# Patient Record
Sex: MALE | Race: WHITE | HISPANIC OR LATINO | Employment: FULL TIME | URBAN - METROPOLITAN AREA
[De-identification: names, ages, dates, MRNs, and addresses within clinical notes are randomized per-mention and may not be internally consistent; named-entity substitution may affect disease eponyms.]

---

## 2021-09-29 ENCOUNTER — HOSPITAL ENCOUNTER (EMERGENCY)
Facility: MEDICAL CENTER | Age: 36
End: 2021-09-29
Attending: EMERGENCY MEDICINE | Admitting: EMERGENCY MEDICINE
Payer: COMMERCIAL

## 2021-09-29 ENCOUNTER — APPOINTMENT (OUTPATIENT)
Dept: RADIOLOGY | Facility: MEDICAL CENTER | Age: 36
End: 2021-09-29
Attending: EMERGENCY MEDICINE
Payer: COMMERCIAL

## 2021-09-29 VITALS
TEMPERATURE: 98.7 F | SYSTOLIC BLOOD PRESSURE: 119 MMHG | OXYGEN SATURATION: 95 % | HEIGHT: 67 IN | RESPIRATION RATE: 19 BRPM | DIASTOLIC BLOOD PRESSURE: 75 MMHG | HEART RATE: 91 BPM | BODY MASS INDEX: 36.1 KG/M2 | WEIGHT: 230 LBS

## 2021-09-29 DIAGNOSIS — U07.1 PNEUMONIA DUE TO COVID-19 VIRUS: ICD-10-CM

## 2021-09-29 DIAGNOSIS — J12.82 PNEUMONIA DUE TO COVID-19 VIRUS: ICD-10-CM

## 2021-09-29 LAB
ALBUMIN SERPL BCP-MCNC: 3.7 G/DL (ref 3.2–4.9)
ALBUMIN/GLOB SERPL: 1.3 G/DL
ALP SERPL-CCNC: 83 U/L (ref 30–99)
ALT SERPL-CCNC: 308 U/L (ref 2–50)
ANION GAP SERPL CALC-SCNC: 15 MMOL/L (ref 7–16)
AST SERPL-CCNC: 123 U/L (ref 12–45)
BASOPHILS # BLD AUTO: 0.2 % (ref 0–1.8)
BASOPHILS # BLD: 0.01 K/UL (ref 0–0.12)
BILIRUB SERPL-MCNC: 0.6 MG/DL (ref 0.1–1.5)
BUN SERPL-MCNC: 17 MG/DL (ref 8–22)
CALCIUM SERPL-MCNC: 8.3 MG/DL (ref 8.4–10.2)
CHLORIDE SERPL-SCNC: 100 MMOL/L (ref 96–112)
CO2 SERPL-SCNC: 17 MMOL/L (ref 20–33)
CREAT SERPL-MCNC: 0.96 MG/DL (ref 0.5–1.4)
D DIMER PPP IA.FEU-MCNC: 0.59 UG/ML (FEU) (ref 0–0.5)
EKG IMPRESSION: NORMAL
EOSINOPHIL # BLD AUTO: 0 K/UL (ref 0–0.51)
EOSINOPHIL NFR BLD: 0 % (ref 0–6.9)
ERYTHROCYTE [DISTWIDTH] IN BLOOD BY AUTOMATED COUNT: 40.2 FL (ref 35.9–50)
GLOBULIN SER CALC-MCNC: 2.9 G/DL (ref 1.9–3.5)
GLUCOSE SERPL-MCNC: 101 MG/DL (ref 65–99)
HCT VFR BLD AUTO: 49.4 % (ref 42–52)
HGB BLD-MCNC: 17.4 G/DL (ref 14–18)
IMM GRANULOCYTES # BLD AUTO: 0.03 K/UL (ref 0–0.11)
IMM GRANULOCYTES NFR BLD AUTO: 0.6 % (ref 0–0.9)
LACTATE BLD-SCNC: 1.9 MMOL/L (ref 0.5–2)
LYMPHOCYTES # BLD AUTO: 0.76 K/UL (ref 1–4.8)
LYMPHOCYTES NFR BLD: 15.5 % (ref 22–41)
MCH RBC QN AUTO: 31.6 PG (ref 27–33)
MCHC RBC AUTO-ENTMCNC: 35.2 G/DL (ref 33.7–35.3)
MCV RBC AUTO: 89.8 FL (ref 81.4–97.8)
MONOCYTES # BLD AUTO: 0.33 K/UL (ref 0–0.85)
MONOCYTES NFR BLD AUTO: 6.7 % (ref 0–13.4)
NEUTROPHILS # BLD AUTO: 3.77 K/UL (ref 1.82–7.42)
NEUTROPHILS NFR BLD: 77 % (ref 44–72)
NRBC # BLD AUTO: 0 K/UL
NRBC BLD-RTO: 0 /100 WBC
PLATELET # BLD AUTO: 116 K/UL (ref 164–446)
PMV BLD AUTO: 11 FL (ref 9–12.9)
POTASSIUM SERPL-SCNC: 3.8 MMOL/L (ref 3.6–5.5)
PROT SERPL-MCNC: 6.6 G/DL (ref 6–8.2)
RBC # BLD AUTO: 5.5 M/UL (ref 4.7–6.1)
SODIUM SERPL-SCNC: 132 MMOL/L (ref 135–145)
TROPONIN T SERPL-MCNC: <6 NG/L (ref 6–19)
WBC # BLD AUTO: 4.9 K/UL (ref 4.8–10.8)

## 2021-09-29 PROCEDURE — A9270 NON-COVERED ITEM OR SERVICE: HCPCS | Performed by: EMERGENCY MEDICINE

## 2021-09-29 PROCEDURE — 85025 COMPLETE CBC W/AUTO DIFF WBC: CPT

## 2021-09-29 PROCEDURE — 71275 CT ANGIOGRAPHY CHEST: CPT

## 2021-09-29 PROCEDURE — 85379 FIBRIN DEGRADATION QUANT: CPT

## 2021-09-29 PROCEDURE — 700102 HCHG RX REV CODE 250 W/ 637 OVERRIDE(OP): Performed by: EMERGENCY MEDICINE

## 2021-09-29 PROCEDURE — 71045 X-RAY EXAM CHEST 1 VIEW: CPT

## 2021-09-29 PROCEDURE — 700117 HCHG RX CONTRAST REV CODE 255: Performed by: EMERGENCY MEDICINE

## 2021-09-29 PROCEDURE — 700105 HCHG RX REV CODE 258: Performed by: EMERGENCY MEDICINE

## 2021-09-29 PROCEDURE — 80053 COMPREHEN METABOLIC PANEL: CPT

## 2021-09-29 PROCEDURE — 700111 HCHG RX REV CODE 636 W/ 250 OVERRIDE (IP): Performed by: EMERGENCY MEDICINE

## 2021-09-29 PROCEDURE — 84484 ASSAY OF TROPONIN QUANT: CPT

## 2021-09-29 PROCEDURE — 87040 BLOOD CULTURE FOR BACTERIA: CPT

## 2021-09-29 PROCEDURE — 83605 ASSAY OF LACTIC ACID: CPT

## 2021-09-29 PROCEDURE — 99284 EMERGENCY DEPT VISIT MOD MDM: CPT

## 2021-09-29 PROCEDURE — 96374 THER/PROPH/DIAG INJ IV PUSH: CPT

## 2021-09-29 PROCEDURE — 93005 ELECTROCARDIOGRAM TRACING: CPT | Performed by: EMERGENCY MEDICINE

## 2021-09-29 RX ORDER — DEXAMETHASONE 4 MG/1
6 TABLET ORAL DAILY
Qty: 15 TABLET | Refills: 0 | Status: ON HOLD | OUTPATIENT
Start: 2021-09-29 | End: 2021-10-04 | Stop reason: SDUPTHER

## 2021-09-29 RX ORDER — ACETAMINOPHEN 500 MG
1000 TABLET ORAL EVERY 6 HOURS PRN
Status: ON HOLD | COMMUNITY
End: 2021-10-04 | Stop reason: SDUPTHER

## 2021-09-29 RX ORDER — BENZONATATE 100 MG/1
100 CAPSULE ORAL ONCE
Status: COMPLETED | OUTPATIENT
Start: 2021-09-29 | End: 2021-09-29

## 2021-09-29 RX ORDER — SODIUM CHLORIDE 9 MG/ML
1000 INJECTION, SOLUTION INTRAVENOUS ONCE
Status: COMPLETED | OUTPATIENT
Start: 2021-09-29 | End: 2021-09-29

## 2021-09-29 RX ORDER — IBUPROFEN 200 MG
600 TABLET ORAL EVERY 6 HOURS PRN
Status: ON HOLD | COMMUNITY
End: 2021-10-04

## 2021-09-29 RX ORDER — BENZONATATE 100 MG/1
100 CAPSULE ORAL 3 TIMES DAILY PRN
Status: DISCONTINUED | OUTPATIENT
Start: 2021-09-29 | End: 2021-09-29

## 2021-09-29 RX ORDER — ONDANSETRON 2 MG/ML
4 INJECTION INTRAMUSCULAR; INTRAVENOUS ONCE
Status: COMPLETED | OUTPATIENT
Start: 2021-09-29 | End: 2021-09-29

## 2021-09-29 RX ORDER — DEXAMETHASONE 4 MG/1
6 TABLET ORAL ONCE
Status: COMPLETED | OUTPATIENT
Start: 2021-09-29 | End: 2021-09-29

## 2021-09-29 RX ORDER — BENZONATATE 100 MG/1
100 CAPSULE ORAL 3 TIMES DAILY PRN
Qty: 60 CAPSULE | Refills: 0 | Status: SHIPPED | OUTPATIENT
Start: 2021-09-29 | End: 2021-10-12

## 2021-09-29 RX ORDER — ONDANSETRON 4 MG/1
4 TABLET, ORALLY DISINTEGRATING ORAL EVERY 8 HOURS PRN
Qty: 10 TABLET | Refills: 1 | Status: ON HOLD | OUTPATIENT
Start: 2021-09-29 | End: 2021-10-04

## 2021-09-29 RX ORDER — BENZONATATE 100 MG/1
100 CAPSULE ORAL
Status: COMPLETED | OUTPATIENT
Start: 2021-09-29 | End: 2021-09-29

## 2021-09-29 RX ADMIN — ONDANSETRON 4 MG: 2 INJECTION INTRAMUSCULAR; INTRAVENOUS at 15:34

## 2021-09-29 RX ADMIN — DEXAMETHASONE 6 MG: 4 TABLET ORAL at 15:22

## 2021-09-29 RX ADMIN — IOHEXOL 75 ML: 350 INJECTION, SOLUTION INTRAVENOUS at 19:36

## 2021-09-29 RX ADMIN — SODIUM CHLORIDE 500 ML: 9 INJECTION, SOLUTION INTRAVENOUS at 15:22

## 2021-09-29 RX ADMIN — BENZONATATE 100 MG: 100 CAPSULE ORAL at 20:49

## 2021-09-29 RX ADMIN — BENZONATATE 100 MG: 100 CAPSULE ORAL at 15:34

## 2021-09-29 NOTE — ED PROVIDER NOTES
ED Provider Note    CHIEF COMPLAINT  Chief Complaint   Patient presents with   • Flu Like Symptoms     symptoms started 9/21; c/o chills, HA, body aches, n/v   • Coronavirus Screening     tested POSITIVE 9/28   • Shortness of Breath     increased in severity last night.        HPI  Jacqueline Atkinson is a 36 y.o. male with no significant past medical history who presents complaining of Covid and increasing shortness of breath.  Patient is not vaccinated for Covid.    Patient states he began having flulike symptoms on 9/21 with chills, headache, body aches, nausea.  He tested positive for Covid on 9/28.    Last night he developed shortness of breath.  He feels that he is choking on his saliva.  He admits to snoring but does not believe that this is contributing to the situation.    Patient had a fever of 103.4 this morning.  He took ibuprofen at 8 AM.    Patient denies chest pain, sore throat, hemoptysis, personal or family history of PE/DVT, calf pain, leg swelling.    Patient has family members with similar symptoms, including his 6-month-old daughter.      ALLERGIES  No Known Allergies    CURRENT MEDICATIONS  Home Medications     Reviewed by Riki Wright (Pharmacy Tech) on 09/29/21 at 1614  Med List Status: Complete   Medication Last Dose Status   acetaminophen (TYLENOL) 500 MG Tab 9/29/2021 Active   ibuprofen (MOTRIN) 200 MG Tab 9/29/2021 Active                PAST MEDICAL HISTORY     Denies    SURGICAL HISTORY  patient denies any surgical history    SOCIAL HISTORY  Social History     Tobacco Use   • Smoking status: Current Some Day Smoker   • Smokeless tobacco: Never Used   Substance and Sexual Activity   • Alcohol use: Yes   • Drug use: Never   • Sexual activity: Not on file       Family Hx:  No family history of PE/DVT      REVIEW OF SYSTEMS  See HPI for further details.  All other systems are negative except as above in HPI.    PHYSICAL EXAM  VITAL SIGNS: /75   Pulse 91   Temp 37.1 °C (98.7 °F)  "(Temporal)   Resp 19   Ht 1.702 m (5' 7\")   Wt 104 kg (230 lb)   SpO2 95%   BMI 36.02 kg/m²    General:  WD male with elevated BMI, nontoxic appearing in NAD; A+Ox3; V/S as above; tachycardic, not tachypneic  Skin: warm and dry; good color; no rash  HEENT: NCAT; EOMs intact; PERRL; no scleral icterus   Neck: FROM, no stridor  Cardiovascular: Tachycardic heart rate and regular rhythm.  No murmurs, rubs, or gallops; pulses 2+ bilaterally radially and DP areas  Lungs: Clear to auscultation with good air movement bilaterally.  No wheezes, rhonchi, or rales.   Abdomen: BS present; soft; NTND; no rebound, guarding, or rigidity.  No organomegaly or pulsatile mass  Extremities: ALLRED x 4; no e/o trauma; no pedal edema; neg Dayana's  Neurologic: CNs III-XII grossly intact; speech clear; distal sensation intact; strength 5/5 UE/LEs  Psychiatric: Appropriate affect, normal mood    LABS  Results for orders placed or performed during the hospital encounter of 09/29/21   CBC WITH DIFFERENTIAL   Result Value Ref Range    WBC 4.9 4.8 - 10.8 K/uL    RBC 5.50 4.70 - 6.10 M/uL    Hemoglobin 17.4 14.0 - 18.0 g/dL    Hematocrit 49.4 42.0 - 52.0 %    MCV 89.8 81.4 - 97.8 fL    MCH 31.6 27.0 - 33.0 pg    MCHC 35.2 33.7 - 35.3 g/dL    RDW 40.2 35.9 - 50.0 fL    Platelet Count 116 (L) 164 - 446 K/uL    MPV 11.0 9.0 - 12.9 fL    Neutrophils-Polys 77.00 (H) 44.00 - 72.00 %    Lymphocytes 15.50 (L) 22.00 - 41.00 %    Monocytes 6.70 0.00 - 13.40 %    Eosinophils 0.00 0.00 - 6.90 %    Basophils 0.20 0.00 - 1.80 %    Immature Granulocytes 0.60 0.00 - 0.90 %    Nucleated RBC 0.00 /100 WBC    Neutrophils (Absolute) 3.77 1.82 - 7.42 K/uL    Lymphs (Absolute) 0.76 (L) 1.00 - 4.80 K/uL    Monos (Absolute) 0.33 0.00 - 0.85 K/uL    Eos (Absolute) 0.00 0.00 - 0.51 K/uL    Baso (Absolute) 0.01 0.00 - 0.12 K/uL    Immature Granulocytes (abs) 0.03 0.00 - 0.11 K/uL    NRBC (Absolute) 0.00 K/uL   CMP   Result Value Ref Range    Sodium 132 (L) 135 - 145 " mmol/L    Potassium 3.8 3.6 - 5.5 mmol/L    Chloride 100 96 - 112 mmol/L    Co2 17 (L) 20 - 33 mmol/L    Anion Gap 15.0 7.0 - 16.0    Glucose 101 (H) 65 - 99 mg/dL    Bun 17 8 - 22 mg/dL    Creatinine 0.96 0.50 - 1.40 mg/dL    Calcium 8.3 (L) 8.4 - 10.2 mg/dL    AST(SGOT) 123 (H) 12 - 45 U/L    ALT(SGPT) 308 (H) 2 - 50 U/L    Alkaline Phosphatase 83 30 - 99 U/L    Total Bilirubin 0.6 0.1 - 1.5 mg/dL    Albumin 3.7 3.2 - 4.9 g/dL    Total Protein 6.6 6.0 - 8.2 g/dL    Globulin 2.9 1.9 - 3.5 g/dL    A-G Ratio 1.3 g/dL   TROPONIN   Result Value Ref Range    Troponin T <6 6 - 19 ng/L   D-DIMER   Result Value Ref Range    D-Dimer Screen 0.59 (H) 0.00 - 0.50 ug/mL (FEU)   LACTIC ACID   Result Value Ref Range    Lactic Acid 1.9 0.5 - 2.0 mmol/L   ESTIMATED GFR   Result Value Ref Range    GFR If African American >60 >60 mL/min/1.73 m 2    GFR If Non African American >60 >60 mL/min/1.73 m 2   EKG   Result Value Ref Range    Report       [facility] Emergency Dept.    Test Date:  2021  Pt Name:    JEANE DELGADO               Department: [facility]  MRN:        [id number]                      [facility]  Gender:     Male                         Technician: NAVI  :        1985                   Requested By:SHIVA JACOBSEN  Order #:    [id number]                    Reading MD: SHIVA JACOBSEN MD    Measurements  Intervals                                Axis  Rate:       109                          P:          48  MO:         154                          QRS:        -77  QRSD:       94                           T:          16  QT:         331  QTc:        446    Interpretive Statements  Sinus tachycardia  Left anterior fascicular block  Abnormal R-wave progression, late transition  t wave inversions in III and aVF  ST elev, probable normal early repol pattern  No previous ECG available for comparison  Electronically Signed On 2021 15:48:41 PDT by SHIVA JACOBSEN MD          IMAGING  CT-CTA CHEST PULMONARY ARTERY W/ RECONS   Final Result         1. No definite CT evidence of pulmonary embolism. Evaluation is limited due to poor contrast enhancement. Ill-defined filling defect in the lingular subsegmental branch, likely artifact.      2. Patchy nodular groundglass and airspace opacities throughout both lungs, in keeping with Covid 19 pneumonia.      More airspace consolidation in the right lower lobe could relate to superimposed bacterial infection.         DX-CHEST-PORTABLE (1 VIEW)   Final Result      Patchy consolidation is compatible with Covid pneumonia        MEDICAL RECORD  I have reviewed patient's medical record and pertinent results are listed below.      COURSE & MEDICAL DECISION MAKING  I have reviewed any medical record information, laboratory studies and radiographic results as noted.    Jacqueline Atkinson is a 36 y.o. male who is known to be Covid positive presents complaining of shortness of breath.    Appropriate PPE was worn at all times while interacting with the patient.    Chest x-ray demonstrates Covid pneumonia.    Ambulatory pulse ox was 91% with a heart rate in the 120s.    Tachycardia may be due to dehydration as the patient had been vomiting and had decreased p.o. intake.  Patient is not currently febrile to explain the tachycardia.    NS bolus of 500 mL (to avoid volume overloading this Covid patient) was ordered for possible dehydration related to patient's decreased p.o. intake and vomiting.    Pt's HR improved to the low 100s after fluid bolus.    CT chest reveals no PE.  Reading mentions possible overlying bacterial process on the right.  The patient has a normal white blood cell count, negative lactic acid, and is afebrile.  He is not complaining of a productive cough with sputum.  He clearly has Covid pneumonia.  I feel that he should be treated as a viral process.     Pt was re-evaluated and advised of CT results.  His heart rate is now in the 80s.   He reports feeling improved after fluids, Zofran, and Tessalon.  Patient was advised to return to the ER for oxygen levels below 90% or worsening symptoms of shortness of breath, vomiting, or other concerns.      FINAL IMPRESSION  1. Pneumonia due to COVID-19 virus  dexamethasone (DECADRON) 4 MG Tab    benzonatate (TESSALON) 100 MG Cap    ondansetron (ZOFRAN ODT) 4 MG TABLET DISPERSIBLE       Electronically signed by: Nikki Ortiz M.D., 9/29/2021 2:03 PM

## 2021-09-29 NOTE — ED NOTES
Med rec updated and complete  Allergies reviewed  Pt reports no prescription medications or vitamins   Pt reports no antibiotics in the last 30 days.      No current facility-administered medications on file prior to encounter.     Current Outpatient Medications on File Prior to Encounter   Medication Sig Dispense Refill   • acetaminophen (TYLENOL) 500 MG Tab Take 1,000 mg by mouth every 6 hours as needed for Moderate Pain.     • ibuprofen (MOTRIN) 200 MG Tab Take 600 mg by mouth every 6 hours as needed for Mild Pain.

## 2021-09-29 NOTE — ED TRIAGE NOTES
"Chief Complaint   Patient presents with   • Flu Like Symptoms     symptoms started 9/21; c/o chills, HA, body aches, n/v   • Coronavirus Screening     tested POSITIVE 9/28   • Shortness of Breath     increased in severity last night.      /82   Pulse (!) 113   Temp 36.8 °C (98.3 °F) (Temporal)   Resp 20   Ht 1.702 m (5' 7\")   SpO2 93%     Pt arrived w/ above concern    Has this patient been vaccinated for COVID - NO  If not, would they like to be vaccinated while in the ER if eligible?  no  Would the patient like to speak with the ERP about the possibility of receiving the COVID vaccine today before making a decision? no  "

## 2021-09-30 NOTE — ED NOTES
Patient verbalized understanding to plan of care and discharge information. Patient in stable condition. No signs of distress. Patient pain free. Patient ambulatory out of ED to personal vehicle with stable gait by self to wife's vehicle.

## 2021-09-30 NOTE — DISCHARGE INSTRUCTIONS
Return to the ER for oxygen level less than 90% or increasing shortness of breath.    Take Tessalon Perles as needed for cough    Take Decadron daily for the next 10 days    Take Zofran as needed for nausea/vomiting    Drink fluids to stay hydrated

## 2021-10-02 ENCOUNTER — APPOINTMENT (OUTPATIENT)
Dept: RADIOLOGY | Facility: MEDICAL CENTER | Age: 36
DRG: 177 | End: 2021-10-02
Attending: EMERGENCY MEDICINE
Payer: COMMERCIAL

## 2021-10-02 ENCOUNTER — HOSPITAL ENCOUNTER (EMERGENCY)
Facility: MEDICAL CENTER | Age: 36
End: 2021-10-02
Payer: COMMERCIAL

## 2021-10-02 ENCOUNTER — HOSPITAL ENCOUNTER (INPATIENT)
Facility: MEDICAL CENTER | Age: 36
LOS: 2 days | DRG: 177 | End: 2021-10-04
Attending: EMERGENCY MEDICINE | Admitting: FAMILY MEDICINE
Payer: COMMERCIAL

## 2021-10-02 DIAGNOSIS — J12.82 PNEUMONIA DUE TO COVID-19 VIRUS: ICD-10-CM

## 2021-10-02 DIAGNOSIS — R06.82 TACHYPNEA: ICD-10-CM

## 2021-10-02 DIAGNOSIS — R09.02 HYPOXEMIA: ICD-10-CM

## 2021-10-02 DIAGNOSIS — U07.1 ACUTE RESPIRATORY FAILURE DUE TO COVID-19 (HCC): ICD-10-CM

## 2021-10-02 DIAGNOSIS — J96.00 ACUTE RESPIRATORY FAILURE DUE TO COVID-19 (HCC): ICD-10-CM

## 2021-10-02 DIAGNOSIS — U07.1 PNEUMONIA DUE TO COVID-19 VIRUS: ICD-10-CM

## 2021-10-02 PROBLEM — R74.01 ELEVATED ALT MEASUREMENT: Status: ACTIVE | Noted: 2021-10-02

## 2021-10-02 PROBLEM — E87.1 HYPONATREMIA: Status: ACTIVE | Noted: 2021-10-02

## 2021-10-02 LAB
ALBUMIN SERPL BCP-MCNC: 4.2 G/DL (ref 3.2–4.9)
ALBUMIN/GLOB SERPL: 1.4 G/DL
ALP SERPL-CCNC: 79 U/L (ref 30–99)
ALT SERPL-CCNC: 147 U/L (ref 2–50)
ANION GAP SERPL CALC-SCNC: 12 MMOL/L (ref 7–16)
AST SERPL-CCNC: 41 U/L (ref 12–45)
BASOPHILS # BLD AUTO: 0.1 % (ref 0–1.8)
BASOPHILS # BLD: 0.01 K/UL (ref 0–0.12)
BILIRUB SERPL-MCNC: 0.5 MG/DL (ref 0.1–1.5)
BUN SERPL-MCNC: 16 MG/DL (ref 8–22)
CALCIUM SERPL-MCNC: 8.7 MG/DL (ref 8.4–10.2)
CHLORIDE SERPL-SCNC: 100 MMOL/L (ref 96–112)
CO2 SERPL-SCNC: 21 MMOL/L (ref 20–33)
CREAT SERPL-MCNC: 0.67 MG/DL (ref 0.5–1.4)
CRP SERPL HS-MCNC: 4.69 MG/DL (ref 0–0.75)
D DIMER PPP IA.FEU-MCNC: 0.44 UG/ML (FEU) (ref 0–0.5)
EKG IMPRESSION: NORMAL
EOSINOPHIL # BLD AUTO: 0 K/UL (ref 0–0.51)
EOSINOPHIL NFR BLD: 0 % (ref 0–6.9)
ERYTHROCYTE [DISTWIDTH] IN BLOOD BY AUTOMATED COUNT: 41 FL (ref 35.9–50)
GLOBULIN SER CALC-MCNC: 2.9 G/DL (ref 1.9–3.5)
GLUCOSE SERPL-MCNC: 116 MG/DL (ref 65–99)
HCT VFR BLD AUTO: 49.7 % (ref 42–52)
HGB BLD-MCNC: 17.1 G/DL (ref 14–18)
IMM GRANULOCYTES # BLD AUTO: 0.05 K/UL (ref 0–0.11)
IMM GRANULOCYTES NFR BLD AUTO: 0.6 % (ref 0–0.9)
LYMPHOCYTES # BLD AUTO: 0.48 K/UL (ref 1–4.8)
LYMPHOCYTES NFR BLD: 5.3 % (ref 22–41)
MCH RBC QN AUTO: 31.5 PG (ref 27–33)
MCHC RBC AUTO-ENTMCNC: 34.4 G/DL (ref 33.7–35.3)
MCV RBC AUTO: 91.5 FL (ref 81.4–97.8)
MONOCYTES # BLD AUTO: 0.37 K/UL (ref 0–0.85)
MONOCYTES NFR BLD AUTO: 4.1 % (ref 0–13.4)
NEUTROPHILS # BLD AUTO: 8.15 K/UL (ref 1.82–7.42)
NEUTROPHILS NFR BLD: 89.9 % (ref 44–72)
NRBC # BLD AUTO: 0 K/UL
NRBC BLD-RTO: 0 /100 WBC
PLATELET # BLD AUTO: 172 K/UL (ref 164–446)
PMV BLD AUTO: 10.4 FL (ref 9–12.9)
POTASSIUM SERPL-SCNC: 4.5 MMOL/L (ref 3.6–5.5)
PROCALCITONIN SERPL-MCNC: 0.13 NG/ML
PROT SERPL-MCNC: 7.1 G/DL (ref 6–8.2)
RBC # BLD AUTO: 5.43 M/UL (ref 4.7–6.1)
SODIUM SERPL-SCNC: 133 MMOL/L (ref 135–145)
TROPONIN T SERPL-MCNC: <6 NG/L (ref 6–19)
WBC # BLD AUTO: 9.1 K/UL (ref 4.8–10.8)

## 2021-10-02 PROCEDURE — 71045 X-RAY EXAM CHEST 1 VIEW: CPT

## 2021-10-02 PROCEDURE — 770006 HCHG ROOM/CARE - MED/SURG/GYN SEMI*

## 2021-10-02 PROCEDURE — 99223 1ST HOSP IP/OBS HIGH 75: CPT | Mod: AI | Performed by: FAMILY MEDICINE

## 2021-10-02 PROCEDURE — 8E0ZXY6 ISOLATION: ICD-10-PCS | Performed by: FAMILY MEDICINE

## 2021-10-02 PROCEDURE — 700102 HCHG RX REV CODE 250 W/ 637 OVERRIDE(OP): Performed by: FAMILY MEDICINE

## 2021-10-02 PROCEDURE — A9270 NON-COVERED ITEM OR SERVICE: HCPCS | Performed by: FAMILY MEDICINE

## 2021-10-02 PROCEDURE — 85025 COMPLETE CBC W/AUTO DIFF WBC: CPT

## 2021-10-02 PROCEDURE — 86140 C-REACTIVE PROTEIN: CPT

## 2021-10-02 PROCEDURE — 80053 COMPREHEN METABOLIC PANEL: CPT

## 2021-10-02 PROCEDURE — 85379 FIBRIN DEGRADATION QUANT: CPT

## 2021-10-02 PROCEDURE — 84484 ASSAY OF TROPONIN QUANT: CPT

## 2021-10-02 PROCEDURE — 93005 ELECTROCARDIOGRAM TRACING: CPT | Performed by: EMERGENCY MEDICINE

## 2021-10-02 PROCEDURE — 99285 EMERGENCY DEPT VISIT HI MDM: CPT

## 2021-10-02 PROCEDURE — 84145 PROCALCITONIN (PCT): CPT

## 2021-10-02 RX ORDER — BISACODYL 10 MG
10 SUPPOSITORY, RECTAL RECTAL
Status: DISCONTINUED | OUTPATIENT
Start: 2021-10-02 | End: 2021-10-04 | Stop reason: HOSPADM

## 2021-10-02 RX ORDER — DEXAMETHASONE 4 MG/1
6 TABLET ORAL DAILY
Status: DISCONTINUED | OUTPATIENT
Start: 2021-10-03 | End: 2021-10-04 | Stop reason: HOSPADM

## 2021-10-02 RX ORDER — LABETALOL HYDROCHLORIDE 5 MG/ML
10 INJECTION, SOLUTION INTRAVENOUS EVERY 4 HOURS PRN
Status: DISCONTINUED | OUTPATIENT
Start: 2021-10-02 | End: 2021-10-04 | Stop reason: HOSPADM

## 2021-10-02 RX ORDER — PROMETHAZINE HYDROCHLORIDE 25 MG/1
12.5-25 SUPPOSITORY RECTAL EVERY 4 HOURS PRN
Status: DISCONTINUED | OUTPATIENT
Start: 2021-10-02 | End: 2021-10-04 | Stop reason: HOSPADM

## 2021-10-02 RX ORDER — AMOXICILLIN 250 MG
2 CAPSULE ORAL 2 TIMES DAILY
Status: DISCONTINUED | OUTPATIENT
Start: 2021-10-02 | End: 2021-10-04 | Stop reason: HOSPADM

## 2021-10-02 RX ORDER — POLYETHYLENE GLYCOL 3350 17 G/17G
1 POWDER, FOR SOLUTION ORAL
Status: DISCONTINUED | OUTPATIENT
Start: 2021-10-02 | End: 2021-10-04 | Stop reason: HOSPADM

## 2021-10-02 RX ORDER — ENALAPRILAT 1.25 MG/ML
1.25 INJECTION INTRAVENOUS EVERY 6 HOURS PRN
Status: DISCONTINUED | OUTPATIENT
Start: 2021-10-02 | End: 2021-10-04 | Stop reason: HOSPADM

## 2021-10-02 RX ORDER — ACETAMINOPHEN 325 MG/1
650 TABLET ORAL EVERY 6 HOURS PRN
Status: DISCONTINUED | OUTPATIENT
Start: 2021-10-02 | End: 2021-10-04 | Stop reason: HOSPADM

## 2021-10-02 RX ORDER — ONDANSETRON 4 MG/1
4 TABLET, ORALLY DISINTEGRATING ORAL EVERY 4 HOURS PRN
Status: DISCONTINUED | OUTPATIENT
Start: 2021-10-02 | End: 2021-10-04 | Stop reason: HOSPADM

## 2021-10-02 RX ORDER — GUAIFENESIN/DEXTROMETHORPHAN 100-10MG/5
10 SYRUP ORAL EVERY 6 HOURS PRN
Status: DISCONTINUED | OUTPATIENT
Start: 2021-10-02 | End: 2021-10-04 | Stop reason: HOSPADM

## 2021-10-02 RX ORDER — PROCHLORPERAZINE EDISYLATE 5 MG/ML
5-10 INJECTION INTRAMUSCULAR; INTRAVENOUS EVERY 4 HOURS PRN
Status: DISCONTINUED | OUTPATIENT
Start: 2021-10-02 | End: 2021-10-04 | Stop reason: HOSPADM

## 2021-10-02 RX ORDER — ALBUTEROL SULFATE 90 UG/1
2 AEROSOL, METERED RESPIRATORY (INHALATION)
Status: DISCONTINUED | OUTPATIENT
Start: 2021-10-02 | End: 2021-10-04 | Stop reason: HOSPADM

## 2021-10-02 RX ORDER — ONDANSETRON 2 MG/ML
4 INJECTION INTRAMUSCULAR; INTRAVENOUS EVERY 4 HOURS PRN
Status: DISCONTINUED | OUTPATIENT
Start: 2021-10-02 | End: 2021-10-04 | Stop reason: HOSPADM

## 2021-10-02 RX ORDER — CLONIDINE HYDROCHLORIDE 0.1 MG/1
0.1 TABLET ORAL EVERY 6 HOURS PRN
Status: DISCONTINUED | OUTPATIENT
Start: 2021-10-02 | End: 2021-10-04 | Stop reason: HOSPADM

## 2021-10-02 RX ORDER — PROMETHAZINE HYDROCHLORIDE 25 MG/1
12.5-25 TABLET ORAL EVERY 4 HOURS PRN
Status: DISCONTINUED | OUTPATIENT
Start: 2021-10-02 | End: 2021-10-04 | Stop reason: HOSPADM

## 2021-10-02 RX ADMIN — GUAIFENESIN SYRUP AND DEXTROMETHORPHAN 10 ML: 100; 10 SYRUP ORAL at 23:11

## 2021-10-02 ASSESSMENT — PATIENT HEALTH QUESTIONNAIRE - PHQ9
SUM OF ALL RESPONSES TO PHQ9 QUESTIONS 1 AND 2: 2
6. FEELING BAD ABOUT YOURSELF - OR THAT YOU ARE A FAILURE OR HAVE LET YOURSELF OR YOUR FAMILY DOWN: SEVERAL DAYS
SUM OF ALL RESPONSES TO PHQ QUESTIONS 1-9: 8
9. THOUGHTS THAT YOU WOULD BE BETTER OFF DEAD, OR OF HURTING YOURSELF: NOT AT ALL
1. LITTLE INTEREST OR PLEASURE IN DOING THINGS: SEVERAL DAYS
7. TROUBLE CONCENTRATING ON THINGS, SUCH AS READING THE NEWSPAPER OR WATCHING TELEVISION: SEVERAL DAYS
3. TROUBLE FALLING OR STAYING ASLEEP OR SLEEPING TOO MUCH: SEVERAL DAYS
8. MOVING OR SPEAKING SO SLOWLY THAT OTHER PEOPLE COULD HAVE NOTICED. OR THE OPPOSITE, BEING SO FIGETY OR RESTLESS THAT YOU HAVE BEEN MOVING AROUND A LOT MORE THAN USUAL: SEVERAL DAYS
4. FEELING TIRED OR HAVING LITTLE ENERGY: SEVERAL DAYS
5. POOR APPETITE OR OVEREATING: SEVERAL DAYS
2. FEELING DOWN, DEPRESSED, IRRITABLE, OR HOPELESS: SEVERAL DAYS

## 2021-10-02 ASSESSMENT — LIFESTYLE VARIABLES
CONSUMPTION TOTAL: INCOMPLETE
TOTAL SCORE: 2
EVER HAD A DRINK FIRST THING IN THE MORNING TO STEADY YOUR NERVES TO GET RID OF A HANGOVER: YES
ALCOHOL_USE: YES
TOTAL SCORE: 2
HAVE PEOPLE ANNOYED YOU BY CRITICIZING YOUR DRINKING: NO
ON A TYPICAL DAY WHEN YOU DRINK ALCOHOL HOW MANY DRINKS DO YOU HAVE: 8
TOTAL SCORE: 2
EVER FELT BAD OR GUILTY ABOUT YOUR DRINKING: NO
HAVE YOU EVER FELT YOU SHOULD CUT DOWN ON YOUR DRINKING: YES
AVERAGE NUMBER OF DAYS PER WEEK YOU HAVE A DRINK CONTAINING ALCOHOL: 5

## 2021-10-02 ASSESSMENT — COGNITIVE AND FUNCTIONAL STATUS - GENERAL
STANDING UP FROM CHAIR USING ARMS: A LITTLE
TOILETING: A LITTLE
CLIMB 3 TO 5 STEPS WITH RAILING: A LITTLE
SUGGESTED CMS G CODE MODIFIER MOBILITY: CJ
DAILY ACTIVITIY SCORE: 23
MOBILITY SCORE: 20
WALKING IN HOSPITAL ROOM: A LITTLE
SUGGESTED CMS G CODE MODIFIER DAILY ACTIVITY: CI
MOVING FROM LYING ON BACK TO SITTING ON SIDE OF FLAT BED: A LITTLE

## 2021-10-02 ASSESSMENT — ENCOUNTER SYMPTOMS
FEVER: 1
SPUTUM PRODUCTION: 0
VOMITING: 0
COUGH: 1
SHORTNESS OF BREATH: 1
ABDOMINAL PAIN: 0
CHILLS: 1
MYALGIAS: 1
NAUSEA: 0

## 2021-10-02 ASSESSMENT — PAIN DESCRIPTION - PAIN TYPE: TYPE: ACUTE PAIN

## 2021-10-02 ASSESSMENT — FIBROSIS 4 INDEX: FIB4 SCORE: 2.24

## 2021-10-02 NOTE — ASSESSMENT & PLAN NOTE
- Pt requiring 4L on the ER - has been on Decadron since 9/29, will continue while in hospital  - Pt not requiring >6L O2, has been symptomatic for >10 days - does not meet criteria for Remdesevir or Toci/Alessandro  - Prone as able  - Isolation  - IS  - PRN albuterol    10/3: Patient seen at bedside this morning.  Still requiring 4 L of oxygen.  We will continue Decadron for now.  Self proning and incentive spirometer as tolerated.

## 2021-10-02 NOTE — ED PROVIDER NOTES
ED Provider Note    CHIEF COMPLAINT  Chief Complaint   Patient presents with   • Coronavirus Screening        HPI  Jacqueline Atkinson is a 36 y.o. male who presents to the ED with worsening symptoms of shortness of breath.  Patient initially started having symptoms on 21 September he was tested positive on the 28th was seen on the 29th in the emergency department had a CT scan of his chest which showed no pulmonary emboli but Covid pneumonia.  Patient was started on dexamethasone 10 days worth of 6 mg tablets.  The patient has been on for about the past 4 days but he just feels like he is getting worse started having increasing shortness of breath he did have a pulse oximeter at home and he was in the low 80s and upper 70s at home so he decided to come back to the emergency department.  Upon arrival here he just describes increasing shortness of breath describes fevers chills headaches muscle aches nonproductive cough.  Patient denies any other symptoms.    REVIEW OF SYSTEMS  See HPI for further details. All other systems are negative.     PAST MEDICAL HISTORY  No past medical history on file.    FAMILY HISTORY  No family history on file.    SOCIAL HISTORY  Social History     Socioeconomic History   • Marital status:      Spouse name: Not on file   • Number of children: Not on file   • Years of education: Not on file   • Highest education level: Not on file   Occupational History   • Not on file   Tobacco Use   • Smoking status: Current Some Day Smoker   • Smokeless tobacco: Never Used   Substance and Sexual Activity   • Alcohol use: Yes   • Drug use: Never   • Sexual activity: Not on file   Other Topics Concern   • Not on file   Social History Narrative   • Not on file     Social Determinants of Health     Financial Resource Strain:    • Difficulty of Paying Living Expenses:    Food Insecurity:    • Worried About Running Out of Food in the Last Year:    • Ran Out of Food in the Last Year:    Transportation  Needs:    • Lack of Transportation (Medical):    • Lack of Transportation (Non-Medical):    Physical Activity:    • Days of Exercise per Week:    • Minutes of Exercise per Session:    Stress:    • Feeling of Stress :    Social Connections:    • Frequency of Communication with Friends and Family:    • Frequency of Social Gatherings with Friends and Family:    • Attends [employer] Services:    • Active Member of Clubs or Organizations:    • Attends Club or Organization Meetings:    • Marital Status:    Intimate Partner Violence:    • Fear of Current or Ex-Partner:    • Emotionally Abused:    • Physically Abused:    • Sexually Abused:       No primary care provider on file.      SURGICAL HISTORY  No past surgical history on file.    CURRENT MEDICATIONS  Home Medications     Reviewed by Riki Lipscomb (Pharmacy Tech) on 10/02/21 at 1510  Med List Status: Complete   Medication Last Dose Status   acetaminophen (TYLENOL) 500 MG Tab PRN Active   Ascorbic Acid (VITAMIN C PO) 10/1/2021 Active   benzonatate (TESSALON) 100 MG Cap 10/2/2021 Active   dexamethasone (DECADRON) 4 MG Tab 10/2/2021 Active   ibuprofen (MOTRIN) 200 MG Tab 10/2/2021 Active   Multiple Vitamins-Minerals (ZINC PO) 10/1/2021 Active   ondansetron (ZOFRAN ODT) 4 MG TABLET DISPERSIBLE 10/1/2021 Active   VITAMIN D PO 10/1/2021 Active              No current facility-administered medications on file prior to encounter.     Current Outpatient Medications on File Prior to Encounter   Medication Sig Dispense Refill   • Ascorbic Acid (VITAMIN C PO) Take 1 Tablet by mouth 2 times a day.     • VITAMIN D PO Take 1 Tablet by mouth every morning.     • Multiple Vitamins-Minerals (ZINC PO) Take 1 Tablet by mouth every morning.     • acetaminophen (TYLENOL) 500 MG Tab Take 1,000 mg by mouth every 6 hours as needed for Moderate Pain.     • ibuprofen (MOTRIN) 200 MG Tab Take 600 mg by mouth every 6 hours as needed for Mild Pain.     • dexamethasone (DECADRON) 4 MG Tab  "Take 1.5 Tablets by mouth every day for 10 days. 15 Tablet 0   • benzonatate (TESSALON) 100 MG Cap Take 1 Capsule by mouth 3 times a day as needed for Cough. 60 Capsule 0   • ondansetron (ZOFRAN ODT) 4 MG TABLET DISPERSIBLE Take 1 Tablet by mouth every 8 hours as needed for Nausea. 10 Tablet 1         ALLERGIES  Allergies   Allergen Reactions   • Peach Flavor Rash and Itching     Itching & Rash         PHYSICAL EXAM  VITAL SIGNS: /62   Pulse 68   Temp 36.5 °C (97.7 °F) (Tympanic)   Resp 20   Ht 1.702 m (5' 7\")   Wt 101 kg (222 lb 14.2 oz)   SpO2 93%   BMI 34.91 kg/m²    Pulse Oximetry was obtained. It showed a reading of Pulse Oximetry: 93 %.  I interpreted this as on 4 L which is nonhypoxic the patient was 84% on room air which is interpreted as hypoxic.     Constitutional: Well developed, Well nourished, No acute distress, Non-toxic appearance.  Mildly tachypneic  HENT: Normocephalic, Atraumatic, Bilateral external ears normal, bilateral tympanic membranes normal, Oropharynx dry mucous membranes, No oral exudates, Nose normal.   Eyes:  conjunctiva is normal, there are no signs of exudate.   Neck: Supple, no cervical lymphadenopathy, no meningeal signs..   Lymphatic: No lymphadenopathy noted.   Cardiovascular: Regular rate and rhythm without murmurs gallops or rubs.   Thorax & Lungs: Lungs are clear to auscultation bilaterally but diminished there is some mild crackles, there are no wheezes. Chest wall is nontender.  Abdomen: Soft, nontender nondistended. Bowel sounds are present.   Skin: Warm, Dry, No erythema,   Back: No tenderness, No CVA tenderness.  Musculoskeletal: Good range of motion in all major joints. No tenderness to palpation or major deformities noted. Intact distal pulses, no clubbing, no cyanosis, no edema no asymmetric edema no tenderness  Neurologic: Alert & oriented x 3, Normal motor function, Normal sensory function, No focal deficits noted.   Psychiatric: Affect normal, Judgment " normal, Mood normal.     EKG  Interpreted below by myself    RADIOLOGY/PROCEDURES  DX-CHEST-PORTABLE (1 VIEW)   Final Result      Again seen patchy bilateral infiltrates consistent with: pneumonia.          Results for orders placed or performed during the hospital encounter of 10/02/21   CBC with Differential   Result Value Ref Range    WBC 9.1 4.8 - 10.8 K/uL    RBC 5.43 4.70 - 6.10 M/uL    Hemoglobin 17.1 14.0 - 18.0 g/dL    Hematocrit 49.7 42.0 - 52.0 %    MCV 91.5 81.4 - 97.8 fL    MCH 31.5 27.0 - 33.0 pg    MCHC 34.4 33.7 - 35.3 g/dL    RDW 41.0 35.9 - 50.0 fL    Platelet Count 172 164 - 446 K/uL    MPV 10.4 9.0 - 12.9 fL    Neutrophils-Polys 89.90 (H) 44.00 - 72.00 %    Lymphocytes 5.30 (L) 22.00 - 41.00 %    Monocytes 4.10 0.00 - 13.40 %    Eosinophils 0.00 0.00 - 6.90 %    Basophils 0.10 0.00 - 1.80 %    Immature Granulocytes 0.60 0.00 - 0.90 %    Nucleated RBC 0.00 /100 WBC    Neutrophils (Absolute) 8.15 (H) 1.82 - 7.42 K/uL    Lymphs (Absolute) 0.48 (L) 1.00 - 4.80 K/uL    Monos (Absolute) 0.37 0.00 - 0.85 K/uL    Eos (Absolute) 0.00 0.00 - 0.51 K/uL    Baso (Absolute) 0.01 0.00 - 0.12 K/uL    Immature Granulocytes (abs) 0.05 0.00 - 0.11 K/uL    NRBC (Absolute) 0.00 K/uL   Complete Metabolic Panel (CMP)   Result Value Ref Range    Sodium 133 (L) 135 - 145 mmol/L    Potassium 4.5 3.6 - 5.5 mmol/L    Chloride 100 96 - 112 mmol/L    Co2 21 20 - 33 mmol/L    Anion Gap 12.0 7.0 - 16.0    Glucose 116 (H) 65 - 99 mg/dL    Bun 16 8 - 22 mg/dL    Creatinine 0.67 0.50 - 1.40 mg/dL    Calcium 8.7 8.4 - 10.2 mg/dL    AST(SGOT) 41 12 - 45 U/L    ALT(SGPT) 147 (H) 2 - 50 U/L    Alkaline Phosphatase 79 30 - 99 U/L    Total Bilirubin 0.5 0.1 - 1.5 mg/dL    Albumin 4.2 3.2 - 4.9 g/dL    Total Protein 7.1 6.0 - 8.2 g/dL    Globulin 2.9 1.9 - 3.5 g/dL    A-G Ratio 1.4 g/dL   ESTIMATED GFR   Result Value Ref Range    GFR If African American >60 >60 mL/min/1.73 m 2    GFR If Non African American >60 >60 mL/min/1.73 m 2    PROCALCITONIN   Result Value Ref Range    Procalcitonin 0.13 <0.25 ng/mL   D-DIMER   Result Value Ref Range    D-Dimer Screen 0.44 0.00 - 0.50 ug/mL (FEU)   CRP QUANTITIVE (NON-CARDIAC)   Result Value Ref Range    Stat C-Reactive Protein 4.69 (H) 0.00 - 0.75 mg/dL   EKG   Result Value Ref Range    Report       [facility] Emergency Dept.    Test Date:  2021-10-02  Pt Name:    JEANE DELGADO               Department: [facility]  MRN:        [id number]                      Room:       -ROOM 5  Gender:     Male                         Technician: SATHYA  :        1985                   Requested By:BIANCA GARCIA  Order #:    [id number]                    Reading MD: BIANCA GARCIA MD    Measurements  Intervals                                Axis  Rate:       62                           P:          36  [place]:         150                          QRS:        -23  QRSD:       117                          T:          12  QT:         425  QTc:        432    Interpretive Statements  Sinus rhythm  Nonspecific intraventricular conduction delay  ST elev, probable normal early repol pattern  Compared to ECG 2021 15:34:09  Intraventricular conduction delay now present  Sinus tachycardia no longer present  Left anterior fascicular block no longer present  ST (T wave) deviati on still present  Electronically Signed On 10-2-2021 14:26:20 PDT by BIANCA GARCIA MD           COURSE & MEDICAL DECISION MAKING  Pertinent Labs & Imaging studies reviewed. (See chart for details)  She presents emerge department for evaluation.  The patient does have Covid chest x-ray is unchanged EKG as above.  Patient is rather tachypneic upon initial evaluation when he gets up to walk he goes up into the 38 range.  At this point he does not meet criteria for home monitoring or home oxygen because of his tachypnea and hypoxemia so we will admit the patient for further treatment and care I have spoken to the hospitalist  for this admission.  Currently he is on what appears to be maximal therapy of steroids oxygen.    FINAL IMPRESSION  1. Pneumonia due to COVID-19 virus     2. Hypoxemia     3. Tachypnea                 Electronically signed by: Akash Oro M.D., 10/2/2021 11:42 AM

## 2021-10-02 NOTE — ED TRIAGE NOTES
Chief Complaint   Patient presents with   • Coronavirus Screening   Arrives positive covid with SOB, 84% on room air, O2 applied at 3L.  He was seen here this week for same, returns due to low sats and feeling terrible.

## 2021-10-02 NOTE — ASSESSMENT & PLAN NOTE
- Continue decadron  - Had negative CTA on 9/29 - will recheck d-dimer, procal and CRP given his worsening respiratory status since then  - Does not qualify for Remdesevir   - Initiate antibiotics only if procal is elevated  - Avoid IVFs as able    10/3: Continue decadron for now.

## 2021-10-02 NOTE — ED NOTES
Pharmacy Medication Reconciliation      ~Medication reconciliation updated and complete per pt at bedside  ~Allergies have been verified and updated   ~No oral ABX within the last 30 days  ~Patient home pharmacy:[facility]      ~Patient reports he started a 10 day course of Decadron 09/29/2021

## 2021-10-02 NOTE — ASSESSMENT & PLAN NOTE
- Pt relates a history of fatty liver, labs consistent with previous 2019 labs at [facility]  - Continue to monitor    10/3: This could also be due to to the patient's current COVID-19 status.  We will monitor, repeat CMP.

## 2021-10-02 NOTE — H&P
Hospital Medicine History & Physical Note    Date of Service  10/2/2021    Primary Care Physician  No primary care provider on file.    Consultants  None    Specialist Names: None    Code Status  Full Code    Chief Complaint  Chief Complaint   Patient presents with   • Coronavirus Screening       History of Presenting Illness  Jacqueline Atkinson is a 36 y.o. male who presented 10/2/2021 with COVID PNA. He denies a past medical history other than fatty liver.  His symptoms began September 21st, and he tested positive on 9/28 - he is unvaccinated.  He came to our ER on 9/29 for SOB and was discharged with Decadron, which he states he has been compliant with.  He also had a negative CTA at that time. He went home but states that he continues to spike fevers as high as 103.2, with increasing SOB - he is primarily SOB when he has a coughing spell (nonproductive), somewhat PARISH, and has chest pain but only when coughing. He has no abdominal pain or other complaints at this time. On arrival to the ER, he required 4L of O2, and became dyspneic with a RR of 38 when ambulating.    I discussed the plan of care with patient and ERp.    Review of Systems  Review of Systems   Constitutional: Positive for chills and fever.   Respiratory: Positive for cough and shortness of breath. Negative for sputum production.    Cardiovascular: Positive for chest pain (When coughing). Negative for leg swelling.   Gastrointestinal: Negative for abdominal pain, nausea and vomiting.   Musculoskeletal: Positive for myalgias.   All other systems reviewed and are negative.      Past Medical History   has no past medical history on file.    Surgical History   has no past surgical history on file.     Family History  family history is not on file.   Family history reviewed with patient. There is no family history that is pertinent to the chief complaint.     Social History   reports that he has been smoking. He has never used smokeless tobacco. He reports  current alcohol use. He reports that he does not use drugs.    Allergies  Allergies   Allergen Reactions   • Peach Flavor Rash and Itching     Itching & Rash         Medications  Prior to Admission Medications   Prescriptions Last Dose Informant Patient Reported? Taking?   Ascorbic Acid (VITAMIN C PO) 10/1/2021 at PM Patient Yes Yes   Sig: Take 1 Tablet by mouth 2 times a day.   Multiple Vitamins-Minerals (ZINC PO) 10/1/2021 at AM Patient Yes Yes   Sig: Take 1 Tablet by mouth every morning.   VITAMIN D PO 10/1/2021 at AM Patient Yes Yes   Sig: Take 1 Tablet by mouth every morning.   acetaminophen (TYLENOL) 500 MG Tab PRN at PRN Patient Yes No   Sig: Take 1,000 mg by mouth every 6 hours as needed for Moderate Pain.   benzonatate (TESSALON) 100 MG Cap 10/2/2021 at 0700 Patient No No   Sig: Take 1 Capsule by mouth 3 times a day as needed for Cough.   dexamethasone (DECADRON) 4 MG Tab 10/2/2021 at 0700 Patient No No   Sig: Take 1.5 Tablets by mouth every day for 10 days.   ibuprofen (MOTRIN) 200 MG Tab 10/2/2021 at 0800 Patient Yes No   Sig: Take 600 mg by mouth every 6 hours as needed for Mild Pain.   ondansetron (ZOFRAN ODT) 4 MG TABLET DISPERSIBLE 10/1/2021 at AFTERNOON Patient No No   Sig: Take 1 Tablet by mouth every 8 hours as needed for Nausea.      Facility-Administered Medications: None       Physical Exam  Temp:  [36.5 °C (97.7 °F)] 36.5 °C (97.7 °F)  Pulse:  [60-89] 85  Resp:  [16-38] 28  BP: (108-133)/(63-81) 126/69  SpO2:  [93 %-95 %] 94 %  Blood Pressure: 126/69   Temperature: 36.5 °C (97.7 °F)   Pulse: 85   Respiration: (!) 28   Pulse Oximetry: 94 %       Physical Exam  Vitals and nursing note reviewed.   Constitutional:       Appearance: He is ill-appearing. He is not toxic-appearing.   HENT:      Head: Normocephalic and atraumatic.   Cardiovascular:      Rate and Rhythm: Normal rate and regular rhythm.      Heart sounds: No murmur heard.     Pulmonary:      Effort: Pulmonary effort is normal.       Breath sounds: Normal breath sounds.      Comments: Diminished bilaterally    Abdominal:      General: Abdomen is flat. Bowel sounds are normal. There is no distension.      Palpations: Abdomen is soft.      Tenderness: There is no abdominal tenderness. There is no guarding or rebound.   Musculoskeletal:         General: No swelling.   Skin:     General: Skin is warm and dry.   Neurological:      General: No focal deficit present.      Mental Status: He is alert and oriented to person, place, and time.   Psychiatric:         Mood and Affect: Mood normal.         Behavior: Behavior normal.         Laboratory:  Recent Labs     10/02/21  1140   WBC 9.1   RBC 5.43   HEMOGLOBIN 17.1   HEMATOCRIT 49.7   MCV 91.5   MCH 31.5   MCHC 34.4   RDW 41.0   PLATELETCT 172   MPV 10.4     Recent Labs     10/02/21  1140   SODIUM 133*   POTASSIUM 4.5   CHLORIDE 100   CO2 21   GLUCOSE 116*   BUN 16   CREATININE 0.67   CALCIUM 8.7     Recent Labs     10/02/21  1140   ALTSGPT 147*   ASTSGOT 41   ALKPHOSPHAT 79   TBILIRUBIN 0.5   GLUCOSE 116*         No results for input(s): NTPROBNP in the last 72 hours.      No results for input(s): TROPONINT in the last 72 hours.    Imaging:  DX-CHEST-PORTABLE (1 VIEW)   Final Result      Again seen patchy bilateral infiltrates consistent with: pneumonia.          X-Ray:  I have personally reviewed the images and compared with prior images. and My impression is: COVID PNA - bilateral infiltrates  EKG:  I have personally reviewed the images and compared with prior images. and My impression is: Early repol    Assessment/Plan:  I anticipate this patient will require at least two midnights for appropriate medical management, necessitating inpatient admission.    * Acute respiratory failure due to COVID-19 (HCC)  Assessment & Plan  - Pt requiring 4L on the ER - has been on Decadron since 9/29, will continue while in hospital  - Pt not requiring >6L O2, has been symptomatic for >10 days - does not meet  criteria for Remdesevir or Toci/Alessandro  - Prone as able  - Isolation  - IS  - PRN albuterol      Hyponatremia  Assessment & Plan  - Mild, monitor    Elevated ALT measurement  Assessment & Plan  - Pt relates a history of fatty liver, labs consistent with previous 2019 labs at [facility]  - Covid contribution??  - Continue to monitor    Pneumonia due to COVID-19 virus  Assessment & Plan  - Continue decadron  - Had negative CTA on 9/29 - will recheck d-dimer, procal and CRP given his worsening respiratory status since then  - Does not qualify for Remdesevir   - Initiate antibiotics only if procal is elevated  - Avoid IVFs as able      VTE prophylaxis: enoxaparin ppx

## 2021-10-03 PROBLEM — Z71.89 ADVANCE CARE PLANNING: Status: ACTIVE | Noted: 2021-10-03

## 2021-10-03 LAB
ALBUMIN SERPL BCP-MCNC: 3.2 G/DL (ref 3.2–4.9)
ALBUMIN/GLOB SERPL: 1 G/DL
ALP SERPL-CCNC: 75 U/L (ref 30–99)
ALT SERPL-CCNC: 145 U/L (ref 2–50)
ANION GAP SERPL CALC-SCNC: 11 MMOL/L (ref 7–16)
AST SERPL-CCNC: 53 U/L (ref 12–45)
BASOPHILS # BLD AUTO: 0 % (ref 0–1.8)
BASOPHILS # BLD: 0 K/UL (ref 0–0.12)
BILIRUB SERPL-MCNC: 0.6 MG/DL (ref 0.1–1.5)
BUN SERPL-MCNC: 18 MG/DL (ref 8–22)
CALCIUM SERPL-MCNC: 8.6 MG/DL (ref 8.4–10.2)
CHLORIDE SERPL-SCNC: 105 MMOL/L (ref 96–112)
CO2 SERPL-SCNC: 19 MMOL/L (ref 20–33)
CREAT SERPL-MCNC: 0.57 MG/DL (ref 0.5–1.4)
EOSINOPHIL # BLD AUTO: 0 K/UL (ref 0–0.51)
EOSINOPHIL NFR BLD: 0 % (ref 0–6.9)
ERYTHROCYTE [DISTWIDTH] IN BLOOD BY AUTOMATED COUNT: 42.5 FL (ref 35.9–50)
GLOBULIN SER CALC-MCNC: 3.1 G/DL (ref 1.9–3.5)
GLUCOSE SERPL-MCNC: 104 MG/DL (ref 65–99)
HCT VFR BLD AUTO: 49.8 % (ref 42–52)
HGB BLD-MCNC: 16.9 G/DL (ref 14–18)
LYMPHOCYTES # BLD AUTO: 1.39 K/UL (ref 1–4.8)
LYMPHOCYTES NFR BLD: 22 % (ref 22–41)
MANUAL DIFF BLD: NORMAL
MCH RBC QN AUTO: 31.2 PG (ref 27–33)
MCHC RBC AUTO-ENTMCNC: 33.9 G/DL (ref 33.7–35.3)
MCV RBC AUTO: 92.1 FL (ref 81.4–97.8)
MONOCYTES # BLD AUTO: 0.44 K/UL (ref 0–0.85)
MONOCYTES NFR BLD AUTO: 7 % (ref 0–13.4)
NEUTROPHILS # BLD AUTO: 4.47 K/UL (ref 1.82–7.42)
NEUTROPHILS NFR BLD: 70 % (ref 44–72)
NEUTS BAND NFR BLD MANUAL: 1 % (ref 0–10)
NRBC # BLD AUTO: 0 K/UL
NRBC BLD-RTO: 0 /100 WBC
PLATELET # BLD AUTO: 174 K/UL (ref 164–446)
PLATELET BLD QL SMEAR: NORMAL
PMV BLD AUTO: 10.5 FL (ref 9–12.9)
POTASSIUM SERPL-SCNC: 4.3 MMOL/L (ref 3.6–5.5)
PROT SERPL-MCNC: 6.3 G/DL (ref 6–8.2)
RBC # BLD AUTO: 5.41 M/UL (ref 4.7–6.1)
RBC BLD AUTO: NORMAL
SODIUM SERPL-SCNC: 135 MMOL/L (ref 135–145)
WBC # BLD AUTO: 6.3 K/UL (ref 4.8–10.8)

## 2021-10-03 PROCEDURE — 80053 COMPREHEN METABOLIC PANEL: CPT

## 2021-10-03 PROCEDURE — 85027 COMPLETE CBC AUTOMATED: CPT

## 2021-10-03 PROCEDURE — 770006 HCHG ROOM/CARE - MED/SURG/GYN SEMI*

## 2021-10-03 PROCEDURE — A9270 NON-COVERED ITEM OR SERVICE: HCPCS | Performed by: FAMILY MEDICINE

## 2021-10-03 PROCEDURE — 700102 HCHG RX REV CODE 250 W/ 637 OVERRIDE(OP): Performed by: FAMILY MEDICINE

## 2021-10-03 PROCEDURE — 99233 SBSQ HOSP IP/OBS HIGH 50: CPT | Performed by: INTERNAL MEDICINE

## 2021-10-03 PROCEDURE — 85007 BL SMEAR W/DIFF WBC COUNT: CPT

## 2021-10-03 PROCEDURE — 700111 HCHG RX REV CODE 636 W/ 250 OVERRIDE (IP): Performed by: FAMILY MEDICINE

## 2021-10-03 RX ADMIN — GUAIFENESIN SYRUP AND DEXTROMETHORPHAN 10 ML: 100; 10 SYRUP ORAL at 08:00

## 2021-10-03 RX ADMIN — ENOXAPARIN SODIUM 40 MG: 40 INJECTION SUBCUTANEOUS at 06:14

## 2021-10-03 RX ADMIN — DEXAMETHASONE 6 MG: 4 TABLET ORAL at 06:14

## 2021-10-03 RX ADMIN — GUAIFENESIN SYRUP AND DEXTROMETHORPHAN 10 ML: 100; 10 SYRUP ORAL at 21:11

## 2021-10-03 ASSESSMENT — PATIENT HEALTH QUESTIONNAIRE - PHQ9
4. FEELING TIRED OR HAVING LITTLE ENERGY: SEVERAL DAYS
8. MOVING OR SPEAKING SO SLOWLY THAT OTHER PEOPLE COULD HAVE NOTICED. OR THE OPPOSITE, BEING SO FIGETY OR RESTLESS THAT YOU HAVE BEEN MOVING AROUND A LOT MORE THAN USUAL: NOT AT ALL
7. TROUBLE CONCENTRATING ON THINGS, SUCH AS READING THE NEWSPAPER OR WATCHING TELEVISION: NOT AT ALL
3. TROUBLE FALLING OR STAYING ASLEEP OR SLEEPING TOO MUCH: NOT AT ALL
SUM OF ALL RESPONSES TO PHQ QUESTIONS 1-9: 3
1. LITTLE INTEREST OR PLEASURE IN DOING THINGS: SEVERAL DAYS
9. THOUGHTS THAT YOU WOULD BE BETTER OFF DEAD, OR OF HURTING YOURSELF: NOT AT ALL
2. FEELING DOWN, DEPRESSED, IRRITABLE, OR HOPELESS: SEVERAL DAYS
6. FEELING BAD ABOUT YOURSELF - OR THAT YOU ARE A FAILURE OR HAVE LET YOURSELF OR YOUR FAMILY DOWN: NOT AL ALL
SUM OF ALL RESPONSES TO PHQ9 QUESTIONS 1 AND 2: 2
5. POOR APPETITE OR OVEREATING: NOT AT ALL

## 2021-10-03 ASSESSMENT — ENCOUNTER SYMPTOMS
BLURRED VISION: 0
CHILLS: 0
ABDOMINAL PAIN: 0
HEARTBURN: 0
DOUBLE VISION: 0
PALPITATIONS: 0
FEVER: 0
COUGH: 1
NERVOUS/ANXIOUS: 0
MYALGIAS: 1
VOMITING: 0
HEADACHES: 0
SHORTNESS OF BREATH: 1
BACK PAIN: 0
FALLS: 0
DIZZINESS: 0

## 2021-10-03 ASSESSMENT — PAIN DESCRIPTION - PAIN TYPE
TYPE: ACUTE PAIN
TYPE: ACUTE PAIN

## 2021-10-03 ASSESSMENT — LIFESTYLE VARIABLES: SUBSTANCE_ABUSE: 0

## 2021-10-03 NOTE — PROGRESS NOTES
Patient remained on 4 L throughout the night. He proned for about 2 hours.    covid-19 surge in effect

## 2021-10-03 NOTE — PROGRESS NOTES
"Hospital Medicine Daily Progress Note    Date of Service  10/3/2021    Chief Complaint  Jacqueline Atkinson is a 36 y.o. male admitted 10/2/2021 with shortness of breath    Hospital Course  As per chart review:  \"36 y.o. male who presented 10/2/2021 with COVID PNA. He denies a past medical history other than fatty liver.  His symptoms began September 21st, and he tested positive on 9/28 - he is unvaccinated.  He came to our ER on 9/29 for SOB and was discharged with Decadron, which he states he has been compliant with.  He also had a negative CTA at that time. He went home but states that he continues to spike fevers as high as 103.2, with increasing SOB - he is primarily SOB when he has a coughing spell (nonproductive), somewhat PARISH, and has chest pain but only when coughing. He has no abdominal pain or other complaints at this time. On arrival to the ER, he required 4L of O2, and became dyspneic with a RR of 38 when ambulating.\"    Interval Problem Update  10/3: Patient seen at bedside this morning.  Patient lying in bed.  He mentions he is feeling somewhat better today.  Patient still requiring 4 L of oxygen.  We will continue to titrate down as tolerated.  Continue self proning and incentive spirometer as tolerated.  We will continue with Decadron for now.  As per nursing no other overnight events reported.    I have personally seen and examined the patient at bedside. I discussed the plan of care with patient and charge RN.    Consultants/Specialty  None for now    Code Status  Full Code    Disposition  Patient is not medically cleared.   Anticipate discharge to pending clinical evolution.    Review of Systems  Review of Systems   Constitutional: Positive for malaise/fatigue. Negative for chills and fever.   HENT: Negative for hearing loss and nosebleeds.    Eyes: Negative for blurred vision and double vision.   Respiratory: Positive for cough and shortness of breath.    Cardiovascular: Negative for chest pain and " palpitations.   Gastrointestinal: Negative for abdominal pain, heartburn and vomiting.   Genitourinary: Negative for dysuria and urgency.   Musculoskeletal: Positive for myalgias. Negative for back pain and falls.   Skin: Negative for itching and rash.   Neurological: Negative for dizziness and headaches.   Psychiatric/Behavioral: Negative for substance abuse. The patient is not nervous/anxious.    All other systems reviewed and are negative.       Physical Exam  Temp:  [36.4 °C (97.5 °F)-36.8 °C (98.2 °F)] 36.6 °C (97.8 °F)  Pulse:  [59-85] 65  Resp:  [16-38] 18  BP: (108-126)/(62-76) 112/70  SpO2:  [92 %-95 %] 94 %    Physical Exam  Vitals and nursing note reviewed.   Constitutional:       Appearance: Normal appearance.   HENT:      Head: Normocephalic and atraumatic.      Right Ear: External ear normal.      Left Ear: External ear normal.      Nose: Nose normal.      Mouth/Throat:      Mouth: Mucous membranes are moist.      Pharynx: Oropharynx is clear.   Eyes:      General:         Right eye: No discharge.         Left eye: No discharge.      Extraocular Movements: Extraocular movements intact.      Pupils: Pupils are equal, round, and reactive to light.   Cardiovascular:      Rate and Rhythm: Normal rate and regular rhythm.      Heart sounds: No murmur heard.     Pulmonary:      Effort: Respiratory distress present.      Breath sounds: Rhonchi present.   Abdominal:      General: Bowel sounds are normal. There is no distension.      Palpations: Abdomen is soft.      Tenderness: There is no abdominal tenderness.   Musculoskeletal:      Cervical back: Normal range of motion and neck supple.      Right lower leg: No edema.      Left lower leg: No edema.   Skin:     General: Skin is warm.   Neurological:      General: No focal deficit present.      Mental Status: He is alert and oriented to person, place, and time.   Psychiatric:         Mood and Affect: Mood normal.         Behavior: Behavior normal.          Thought Content: Thought content normal.         Judgment: Judgment normal.         Fluids  No intake or output data in the 24 hours ending 10/03/21 1209    Laboratory  Recent Labs     10/02/21  1140 10/03/21  0314   WBC 9.1 6.3   RBC 5.43 5.41   HEMOGLOBIN 17.1 16.9   HEMATOCRIT 49.7 49.8   MCV 91.5 92.1   MCH 31.5 31.2   MCHC 34.4 33.9   RDW 41.0 42.5   PLATELETCT 172 174   MPV 10.4 10.5     Recent Labs     10/02/21  1140 10/03/21  0314   SODIUM 133* 135   POTASSIUM 4.5 4.3   CHLORIDE 100 105   CO2 21 19*   GLUCOSE 116* 104*   BUN 16 18   CREATININE 0.67 0.57   CALCIUM 8.7 8.6                   Imaging  DX-CHEST-PORTABLE (1 VIEW)   Final Result      Again seen patchy bilateral infiltrates consistent with: pneumonia.           Assessment/Plan  * Acute respiratory failure due to COVID-19 (HCC)  Assessment & Plan  - Pt requiring 4L on the ER - has been on Decadron since 9/29, will continue while in hospital  - Pt not requiring >6L O2, has been symptomatic for >10 days - does not meet criteria for Remdesevir or Toci/Alessandro  - Prone as able  - Isolation  - IS  - PRN albuterol    10/3: Patient seen at bedside this morning.  Still requiring 4 L of oxygen.  We will continue Decadron for now.  Self proning and incentive spirometer as tolerated.      Pneumonia due to COVID-19 virus  Assessment & Plan  - Continue decadron  - Had negative CTA on 9/29 - will recheck d-dimer, procal and CRP given his worsening respiratory status since then  - Does not qualify for Remdesevir   - Initiate antibiotics only if procal is elevated  - Avoid IVFs as able    10/3: Continue decadron for now.    Advance care planning  Assessment & Plan  We talked at length regarding different options for CODE STATUS.  Patient would like to remain full code for now.  If needed the patient would like to be intubated.    Hyponatremia  Assessment & Plan  - Mild, monitor    10/3: Within normal limits today    Elevated ALT measurement  Assessment & Plan  - Pt  relates a history of fatty liver, labs consistent with previous 2019 labs at [facility]  - Continue to monitor    10/3: This could also be due to to the patient's current COVID-19 status.  We will monitor, repeat CMP.       VTE prophylaxis: enoxaparin ppx    I have performed a physical exam and reviewed and updated ROS and Plan today (10/3/2021). In review of yesterday's note (10/2/2021), there are no changes except as documented above.

## 2021-10-03 NOTE — ASSESSMENT & PLAN NOTE
We talked at length regarding different options for CODE STATUS.  Patient would like to remain full code for now.  If needed the patient would like to be intubated.

## 2021-10-03 NOTE — PROGRESS NOTES
Assumed patient care. Patient is resting comfortably in bed in no signs of pain or distress. No questions or concerns at this time. Safety measures in place, call light within reach.     covid-19 surge in effect

## 2021-10-03 NOTE — FACE TO FACE
"Face to Face Note  -  Durable Medical Equipment    Eloy Huff M.D. - NPI: [id number]  I certify that this patient is under my care and that they had a durable medical equipment(DME)face to face encounter by myself that meets the physician DME face-to-face encounter requirements with this patient on:    Date of encounter:   Patient:                    MRN:                       YOB: 2022  Jacqueline Atkinson  [id number]  1985     The encounter with the patient was in whole, or in part, for the following medical condition, which is the primary reason for durable medical equipment:  Covid-19 Infection    I certify that, based on my findings, the following durable medical equipment is medically necessary:  Oxygen.    HOME O2 Saturation Measurements:(Values must be present for Home Oxygen orders)  Room air sat at rest: 88  Room air sat with amb: 86  With liters of O2: 4, O2 sat at rest with O2: 96  With Liters of O2: 4, O2 sat with amb with O2 : 92  Is the patient mobile?: Yes    My Clinical findings support the need for the above equipment due to:  Hypoxia    Supporting Symptoms: The patient requires supplemental oxygen, as the following interventions have been tried with limited or no improvement: \"Ambulation with oximetry and \"Incentive spirometry    If patient feels more short of breath, they can go up to 6 liters per minute and contact healthcare provider.  "

## 2021-10-04 ENCOUNTER — TELEPHONE (OUTPATIENT)
Dept: OTHER | Facility: MEDICAL CENTER | Age: 36
End: 2021-10-04

## 2021-10-04 VITALS
RESPIRATION RATE: 20 BRPM | TEMPERATURE: 99.2 F | HEART RATE: 77 BPM | BODY MASS INDEX: 34.98 KG/M2 | WEIGHT: 222.88 LBS | OXYGEN SATURATION: 95 % | HEIGHT: 67 IN | SYSTOLIC BLOOD PRESSURE: 98 MMHG | DIASTOLIC BLOOD PRESSURE: 66 MMHG

## 2021-10-04 VITALS — HEART RATE: 72 BPM | RESPIRATION RATE: 24 BRPM | OXYGEN SATURATION: 94 %

## 2021-10-04 LAB
BACTERIA BLD CULT: NORMAL
BACTERIA BLD CULT: NORMAL
SIGNIFICANT IND 70042: NORMAL
SIGNIFICANT IND 70042: NORMAL
SITE SITE: NORMAL
SITE SITE: NORMAL
SOURCE SOURCE: NORMAL
SOURCE SOURCE: NORMAL

## 2021-10-04 PROCEDURE — 99454 REM MNTR PHYSIOL PARAM 16-30: CPT

## 2021-10-04 PROCEDURE — 99453 REM MNTR PHYSIOL PARAM SETUP: CPT

## 2021-10-04 PROCEDURE — A9270 NON-COVERED ITEM OR SERVICE: HCPCS | Performed by: FAMILY MEDICINE

## 2021-10-04 PROCEDURE — 700102 HCHG RX REV CODE 250 W/ 637 OVERRIDE(OP): Performed by: FAMILY MEDICINE

## 2021-10-04 PROCEDURE — 99239 HOSP IP/OBS DSCHRG MGMT >30: CPT | Performed by: INTERNAL MEDICINE

## 2021-10-04 PROCEDURE — 700111 HCHG RX REV CODE 636 W/ 250 OVERRIDE (IP): Performed by: FAMILY MEDICINE

## 2021-10-04 RX ORDER — DEXAMETHASONE 4 MG/1
6 TABLET ORAL DAILY
Qty: 9 TABLET | Refills: 0 | Status: SHIPPED | OUTPATIENT
Start: 2021-10-04 | End: 2021-10-10

## 2021-10-04 RX ORDER — ACETAMINOPHEN 500 MG
1000 TABLET ORAL EVERY 8 HOURS PRN
Qty: 30 TABLET | Refills: 0 | Status: SHIPPED | OUTPATIENT
Start: 2021-10-04 | End: 2022-04-19

## 2021-10-04 RX ADMIN — ENOXAPARIN SODIUM 40 MG: 40 INJECTION SUBCUTANEOUS at 05:37

## 2021-10-04 RX ADMIN — DEXAMETHASONE 6 MG: 4 TABLET ORAL at 05:37

## 2021-10-04 NOTE — DISCHARGE PLANNING
Received Choice form at 1038  Agency/Facility Name: [facility], [facility], [facility], [facility], [facility], [facility]  Referral sent per Choice form @ 1040     1256-  Agency/Facility Name: [facility]  Spoke To: Katie   Outcome: Referral was not received, DPA re faxed referral with request for callback when received.     1307-  Agency/Facility Name: [facility]  Outcome: Per e-fax response, Pt declined due to non contracted insurance provider.     1310-  Agency/Facility Name: [facility]  Spoke To: Ralph  Outcome: Pt declined due to non contracted insurance provider.    1312-  Agency/Facility Name: [facility]  Referral sent per Choice form @ 1313     1438-  Agency/Facility Name: [facility]   Spoke To: Qiana  Outcome: Order has not been received, DPA resent referral with request for callback upon delivery.     1515-  Agency/Facility Name: [facility]  Spoke To: Pallavi  Outcome: DPA confirmed DME acceptance and delivery, ETA of 1600.

## 2021-10-04 NOTE — PROGRESS NOTES
Pending home oxygen acceptance and delivery. Patient interested in home monitoring system. RN to assist with [employer] download and activation of account.

## 2021-10-04 NOTE — DISCHARGE INSTRUCTIONS
- Discharge Instructions to Pt:  · Follow up with your Primary Care Physician   · Be compliant with your follow up appointments.  · Please be compliant with your medications  · Keep blood pressure controlled and be compliant to keep systolic blood pressure <140.  · Please adhere to a healthy diet, that consist of low fat, low salt, low calorie.  · Weight loss and physical activity as tolerated is encouraged.   · Ambulate with assistance.  ·  If there any signs or symptoms of worsening or symptoms recurring, please call your Primary Care Physician or return to Emergency Department for further assessment.  · Avoid sudden changes in position, like standing up quickly.  · Do not drive until cleared by PCP.  · Refrain from drinking alcohol and smoking.     Recommendation to PCP:  · Would recommend a CBC, BMP in 3-5 days.  · Your pt will need close monitoring.  · If  failure to respond to therapy, we suggest having patient return for further care, or if cannot be treated as an outpatient, return to ED if worsening of symptoms.  · Please make certain your pt follows up with specialist appointments  · Ensure patient adheres to diet and lifestyle modifications and reconcile.  · Please note the change to medication and reconcile.  · Patient without progression of symptoms. Prognosis and risk factors discussed in detail with patient and he understands.    Discharge Instructions    Discharged to home by car with relative. Discharged via wheelchair, hospital escort: Yes.  Special equipment needed: Oxygen    Be sure to schedule a follow-up appointment with your primary care doctor or any specialists as instructed.     Discharge Plan:   Diet Plan: Discussed  Activity Level: Discussed  Confirmed Follow up Appointment: Patient to Call and Schedule Appointment  Confirmed Symptoms Management: Discussed  Medication Reconciliation Updated: Yes    I understand that a diet low in cholesterol, fat, and sodium is recommended for good  health. Unless I have been given specific instructions below for another diet, I accept this instruction as my diet prescription.   Other diet: home diet    Special Instructions: None    · Is patient discharged on Warfarin / Coumadin?   No     Depression / Suicide Risk    As you are discharged from this [facility] facility, it is important to learn how to keep safe from harming yourself.    Recognize the warning signs:  · Abrupt changes in personality, positive or negative- including increase in energy   · Giving away possessions  · Change in eating patterns- significant weight changes-  positive or negative  · Change in sleeping patterns- unable to sleep or sleeping all the time   · Unwillingness or inability to communicate  · Depression  · Unusual sadness, discouragement and loneliness  · Talk of wanting to die  · Neglect of personal appearance   · Rebelliousness- reckless behavior  · Withdrawal from people/activities they love  · Confusion- inability to concentrate     If you or a loved one observes any of these behaviors or has concerns about self-harm, here's what you can do:  · Talk about it- your feelings and reasons for harming yourself  · Remove any means that you might use to hurt yourself (examples: pills, rope, extension cords, firearm)  · Get professional help from the community (Mental Health, Substance Abuse, psychological counseling)  · Do not be alone:Call your Safe Contact- someone whom you trust who will be there for you.  · Call your local CRISIS HOTLINE 729-9292 or 722-288-9827  · Call your local Children's Mobile Crisis Response Team [place] (233) 531-3649 or www.[employer]  · Call the toll free National Suicide Prevention Hotlines   · National Suicide Prevention Lifeline 497-076-VMUZ (2342)  · National Hope Line Network 800-SUICIDE (963-7468)      COVID-19  COVID-19 is a respiratory infection that is caused by a virus called severe acute respiratory syndrome coronavirus 2  (SARS-CoV-2). The disease is also known as coronavirus disease or novel coronavirus. In some people, the virus may not cause any symptoms. In others, it may cause a serious infection. The infection can get worse quickly and can lead to complications, such as:  · Pneumonia, or infection of the lungs.  · Acute respiratory distress syndrome or ARDS. This is fluid build-up in the lungs.  · Acute respiratory failure. This is a condition in which there is not enough oxygen passing from the lungs to the body.  · Sepsis or septic shock. This is a serious bodily reaction to an infection.  · Blood clotting problems.  · Secondary infections due to bacteria or fungus.  The virus that causes COVID-19 is contagious. This means that it can spread from person to person through droplets from coughs and sneezes (respiratory secretions).  What are the causes?  This illness is caused by a virus. You may catch the virus by:  · Breathing in droplets from an infected person's cough or sneeze.  · Touching something, like a table or a doorknob, that was exposed to the virus (contaminated) and then touching your mouth, nose, or eyes.  What increases the risk?  Risk for infection  You are more likely to be infected with this virus if you:  · Live in or travel to an area with a COVID-19 outbreak.  · Come in contact with a sick person who recently traveled to an area with a COVID-19 outbreak.  · Provide care for or live with a person who is infected with COVID-19.  Risk for serious illness  You are more likely to become seriously ill from the virus if you:  · Are 65 years of age or older.  · Have a long-term disease that lowers your body's ability to fight infection (immunocompromised).  · Live in a nursing home or long-term care facility.  · Have a long-term (chronic) disease such as:  ? Chronic lung disease, including chronic obstructive pulmonary disease or asthma  ? Heart disease.  ? Diabetes.  ? Chronic kidney disease.  ? Liver  disease.  · Are obese.  What are the signs or symptoms?  Symptoms of this condition can range from mild to severe. Symptoms may appear any time from 2 to 14 days after being exposed to the virus. They include:  · A fever.  · A cough.  · Difficulty breathing.  · Chills.  · Muscle pains.  · A sore throat.  · Loss of taste or smell.  Some people may also have stomach problems, such as nausea, vomiting, or diarrhea.  Other people may not have any symptoms of COVID-19.  How is this diagnosed?  This condition may be diagnosed based on:  · Your signs and symptoms, especially if:  ? You live in an area with a COVID-19 outbreak.  ? You recently traveled to or from an area where the virus is common.  ? You provide care for or live with a person who was diagnosed with COVID-19.  · A physical exam.  · Lab tests, which may include:  ? A nasal swab to take a sample of fluid from your nose.  ? A throat swab to take a sample of fluid from your throat.  ? A sample of mucus from your lungs (sputum).  ? Blood tests.  · Imaging tests, which may include, X-rays, CT scan, or ultrasound.  How is this treated?  At present, there is no medicine to treat COVID-19. Medicines that treat other diseases are being used on a trial basis to see if they are effective against COVID-19.  Your health care provider will talk with you about ways to treat your symptoms. For most people, the infection is mild and can be managed at home with rest, fluids, and over-the-counter medicines.  Treatment for a serious infection usually takes places in a hospital intensive care unit (ICU). It may include one or more of the following treatments. These treatments are given until your symptoms improve.  · Receiving fluids and medicines through an IV.  · Supplemental oxygen. Extra oxygen is given through a tube in the nose, a face mask, or a hart.  · Positioning you to lie on your stomach (prone position). This makes it easier for oxygen to get into the  lungs.  · Continuous positive airway pressure (CPAP) or bi-level positive airway pressure (BPAP) machine. This treatment uses mild air pressure to keep the airways open. A tube that is connected to a motor delivers oxygen to the body.  · Ventilator. This treatment moves air into and out of the lungs by using a tube that is placed in your windpipe.  · Tracheostomy. This is a procedure to create a hole in the neck so that a breathing tube can be inserted.  · Extracorporeal membrane oxygenation (ECMO). This procedure gives the lungs a chance to recover by taking over the functions of the heart and lungs. It supplies oxygen to the body and removes carbon dioxide.  Follow these instructions at home:  Lifestyle  · If you are sick, stay home except to get medical care. Your health care provider will tell you how long to stay home. Call your health care provider before you go for medical care.  · Rest at home as told by your health care provider.  · Do not use any products that contain nicotine or tobacco, such as cigarettes, e-cigarettes, and chewing tobacco. If you need help quitting, ask your health care provider.  · Return to your normal activities as told by your health care provider. Ask your health care provider what activities are safe for you.  General instructions  · Take over-the-counter and prescription medicines only as told by your health care provider.  · Drink enough fluid to keep your urine pale yellow.  · Keep all follow-up visits as told by your health care provider. This is important.  How is this prevented?    There is no vaccine to help prevent COVID-19 infection. However, there are steps you can take to protect yourself and others from this virus.  To protect yourself:   · Do not travel to areas where COVID-19 is a risk. The areas where COVID-19 is reported change often. To identify high-risk areas and travel restrictions, check the CDC travel website: wwwnc.cdc.gov/travel/notices  · If you live in,  or must travel to, an area where COVID-19 is a risk, take precautions to avoid infection.  ? Stay away from people who are sick.  ? Wash your hands often with soap and water for 20 seconds. If soap and water are not available, use an alcohol-based hand .  ? Avoid touching your mouth, face, eyes, or nose.  ? Avoid going out in public, follow guidance from your state and local health authorities.  ? If you must go out in public, wear a cloth face covering or face mask.  ? Disinfect objects and surfaces that are frequently touched every day. This may include:  § Counters and tables.  § Doorknobs and light switches.  § Sinks and faucets.  § Electronics, such as phones, remote controls, keyboards, computers, and tablets.  To protect others:  If you have symptoms of COVID-19, take steps to prevent the virus from spreading to others.  · If you think you have a COVID-19 infection, contact your health care provider right away. Tell your health care team that you think you may have a COVID-19 infection.  · Stay home. Leave your house only to seek medical care. Do not use public transport.  · Do not travel while you are sick.  · Wash your hands often with soap and water for 20 seconds. If soap and water are not available, use alcohol-based hand .  · Stay away from other members of your household. Let healthy household members care for children and pets, if possible. If you have to care for children or pets, wash your hands often and wear a mask. If possible, stay in your own room, separate from others. Use a different bathroom.  · Make sure that all people in your household wash their hands well and often.  · Cough or sneeze into a tissue or your sleeve or elbow. Do not cough or sneeze into your hand or into the air.  · Wear a cloth face covering or face mask.  Where to find more information  · Centers for Disease Control and Prevention: www.cdc.gov/coronavirus/2019-ncov/index.html  · World Health  Organization: www.who.int/health-topics/coronavirus  Contact a health care provider if:  · You live in or have traveled to an area where COVID-19 is a risk and you have symptoms of the infection.  · You have had contact with someone who has COVID-19 and you have symptoms of the infection.  Get help right away if:  · You have trouble breathing.  · You have pain or pressure in your chest.  · You have confusion.  · You have bluish lips and fingernails.  · You have difficulty waking from sleep.  · You have symptoms that get worse.  These symptoms may represent a serious problem that is an emergency. Do not wait to see if the symptoms will go away. Get medical help right away. Call your local emergency services (911 in the [place] Do not drive yourself to the hospital. Let the emergency medical personnel know if you think you have COVID-19.  Summary  · COVID-19 is a respiratory infection that is caused by a virus. It is also known as coronavirus disease or novel coronavirus. It can cause serious infections, such as pneumonia, acute respiratory distress syndrome, acute respiratory failure, or sepsis.  · The virus that causes COVID-19 is contagious. This means that it can spread from person to person through droplets from coughs and sneezes.  · You are more likely to develop a serious illness if you are 65 years of age or older, have a weak immunity, live in a nursing home, or have chronic disease.  · There is no medicine to treat COVID-19. Your health care provider will talk with you about ways to treat your symptoms.  · Take steps to protect yourself and others from infection. Wash your hands often and disinfect objects and surfaces that are frequently touched every day. Stay away from people who are sick and wear a mask if you are sick.  This information is not intended to replace advice given to you by your health care provider. Make sure you discuss any questions you have with your health care provider.  Document  Released: 01/23/2020 Document Revised: 05/14/2020 Document Reviewed: 01/23/2020  Elsevier Patient Education © 2020 [employer]      Home Oxygen Use, Adult  When a medical condition keeps you from getting enough oxygen, your health care provider may instruct you to take extra oxygen at home. Your health care provider will let you know:  · When to take oxygen.  · For how long to take oxygen.  · How quickly oxygen should be delivered (flow rate), in liters per minute (LPM or L/M).  Home oxygen can be given through:  · A mask.  · A nasal cannula. This is a device or tube that goes in the nostrils.  · A transtracheal catheter. This is a small, flexible tube placed in the trachea.  · A tracheostomy. This is a surgically made opening in the trachea.  These devices are connected with tubing to an oxygen source, such as:  · A tank. Tanks hold oxygen in gas form. They must be replaced when the oxygen is used up.  · A liquid oxygen device. This holds oxygen in liquid form. It must be replaced when the oxygen is used up.  · An oxygen concentrator machine. This filters oxygen in the room. It uses electricity, so you must have a backup cylinder of oxygen in case the power goes out.  Supplies needed:  To use oxygen, you will need:  · A mask, nasal cannula, transtracheal catheter, or tracheostomy.  · An oxygen tank, a liquid oxygen device, or an oxygen concentrator.  · The tape that your health care provider recommends (optional).  If you use a transtracheal catheter and your prescribed flow rate is 1 LPM or greater, you will also need a humidifier.  Risks and complications  · Fire. This can happen if the oxygen is exposed to a heat source, flame, or spark.  · Injury to skin. This can happen if liquid oxygen touches your skin.  · Organ damage. This can happen if you get too little oxygen.  How to use oxygen  Your health care provider or a representative from your medical device company will show you how to use your oxygen device.  Follow her or his instructions. The instructions may look something like this:  1. Wash your hands.  2. If you use an oxygen concentrator, make sure it is plugged in.  3. Place one end of the tube into the port on the tank, device, or machine.  4. Place the mask over your nose and mouth. Or, place the nasal cannula and secure it with tape if instructed. If you use a tracheostomy or transtracheal catheter, connect it to the oxygen source as directed.  5. Make sure the liter-flow setting on the machine is at the level prescribed by your health care provider.  6. Turn on the machine or adjust the knob on the tank or device to the correct liter-flow setting.  7. When you are done, turn off and unplug the machine, or turn the knob to OFF.  How to clean and care for the oxygen supplies  Nasal cannula  · Clean it with a warm, wet cloth daily or as needed.  · Wash it with a liquid soap once a week.  · Rinse it thoroughly once or twice a week.  · Replace it every 2-4 weeks.  · If you have an infection, such as a cold or pneumonia, change the cannula when you get better.  Mask  · Replace it every 2-4 weeks.  · If you have an infection, such as a cold or pneumonia, change the mask when you get better.  Humidifier bottle  · Wash the bottle between each refill:  ? Wash it with soap and warm water.  ? Rinse it thoroughly.  ? Disinfect it and its top.  ? Air-dry it.  · Make sure it is dry before you refill it.  Oxygen concentrator  · Clean the air filter at least twice a week according to directions from your [employer] medical equipment and service company.  · Wipe down the cabinet every day. To do this:  ? Unplug the unit.  ? Wipe down the cabinet with a damp cloth.  ? Dry the cabinet.  Other equipment  · Change any extra tubing every 1-3 months.  · Follow instructions from your health care provider about taking care of any other equipment.  Safety tips  Fire safety tips    · Keep your oxygen and oxygen supplies at least 5 ft away  "from sources of heat, flames, and davila at all times.  · Do not allow smoking near your oxygen. Put up \"no smoking\" signs in your home. Avoid smoking areas when in public.  · Do not use materials that can burn (are flammable) while you use oxygen.  · When you go to a restaurant with portable oxygen, ask to be seated in the nonsmoking section.  · Keep a fire extinguisher close by. Let your fire department know that you have oxygen in your home.  · Test your home smoke detectors regularly.  Traveling  · Secure your oxygen tank in the vehicle so that it does not move around. Follow instructions from your medical device company about how to safely secure your tank.  · Make sure you have enough oxygen for the amount of time you will be away from home.  · If you are planning air travel, contact the airline to find out if they allow the use of an approved portable oxygen concentrator. You may also need documents from your health care provider and medical device company before you travel.  General safety tips  · If you use an oxygen cylinder, make sure it is in a stand or secured to an object that will not move (fixed object).  · If you use liquid oxygen, make sure its container is kept upright.  · If you use an oxygen concentrator:  ? Tell your electric company. Make sure you are given priority service in the event that your power goes out.  ? Avoid using extension cords, if possible.  Follow these instructions at home:  · Use oxygen only as told by your health care provider.  · Do not use alcohol or other drugs that make you relax (sedating drugs) unless instructed. They can slow down your breathing rate and make it hard to get in enough oxygen.  · Know how and when to order a refill of oxygen.  · Always keep a spare tank of oxygen. Plan ahead for holidays when you may not be able to get a prescription filled.  · Use water-based lubricants on your lips or nostrils. Do not use oil-based products like petroleum " jelly.  · To prevent skin irritation on your cheeks or behind your ears, tuck some gauze under the tubing.  Contact a health care provider if:  · You get headaches often.  · You have shortness of breath.  · You have a lasting cough.  · You have anxiety.  · You are sleepy all the time.  · You develop an illness that affects your breathing.  · You cannot exercise at your regular level.  · You are restless.  · You have difficult or irregular breathing, and it is getting worse.  · You have a fever.  · You have persistent redness under your nose.  Get help right away if:  · You are confused.  · You have blue lips or fingernails.  · You are struggling to breathe.  Summary  · Your health care provider or a representative from your medical device company will show you how to use your oxygen device. Follow her or his instructions.  · If you use an oxygen concentrator, make sure it is plugged in.  · Make sure the liter-flow setting on the machine is at the level prescribed by your health care provider.  · Keep your oxygen and oxygen supplies at least 5 ft away from sources of heat, flames, and davila at all times.  This information is not intended to replace advice given to you by your health care provider. Make sure you discuss any questions you have with your health care provider.  Document Released: 03/09/2005 Document Revised: 06/06/2019 Document Reviewed: 07/11/2017  Elsevier Patient Education © 2020 [employer]

## 2021-10-04 NOTE — TELEPHONE ENCOUNTER
Jacqueline is a new start on [facility]. He is currently still at [place] [id number]-

## 2021-10-04 NOTE — DISCHARGE SUMMARY
"Discharge Summary    CHIEF COMPLAINT ON ADMISSION  Chief Complaint   Patient presents with   • Coronavirus Screening       Reason for Admission  sob     Admission Date  10/2/2021    CODE STATUS  Full Code    HPI & HOSPITAL COURSE  As per chart review:  \"36 y.o. male who presented 10/2/2021 with COVID PNA. He denies a past medical history other than fatty liver.  His symptoms began September 21st, and he tested positive on 9/28 - he is unvaccinated.  He came to our ER on 9/29 for SOB and was discharged with Decadron, which he states he has been compliant with.  He also had a negative CTA at that time. He went home but states that he continues to spike fevers as high as 103.2, with increasing SOB - he is primarily SOB when he has a coughing spell (nonproductive), somewhat PARISH, and has chest pain but only when coughing. He has no abdominal pain or other complaints at this time. On arrival to the ER, he required 4L of O2, and became dyspneic with a RR of 38 when ambulating.\"    Patient was admitted due to acute respiratory failure requiring 4 L of oxygen due to COVID-19.  It seems the patient had been taking dexamethasone at home.  We continue dexamethasone while hospitalized.  The patient did not qualify for remdesivir during hospitalization.  For over 24 hours the patient remained stable at 4 L of oxygen.  The patient mentions that he feels much better.  We will discharge home today with close home monitoring system and oxygen.  He will also complete his course of dexamethasone as an outpatient.  The patient was advised that if he felt any worse to come back to the ER for further assessment and evaluation.    D-dimer on this admission was within normal limits there was no reason to perform CTA of the chest.  He did have a CTA of the chest on 9/29/2021 which did not show evidence of pulmonary embolism.    This morning the patient was seen at bedside, currently requiring 4 L of oxygen, however he mentions he feels much " better.  He would like to go home.  He we will discharge home with home oxygen, home monitoring system, to complete Decadron treatment and close follow-up with PCP as an outpatient.      Therefore, he is discharged in fair and stable condition to home with close outpatient follow-up.    The patient recovered much more quickly than anticipated on admission.    Discharge Date  10/4/21    FOLLOW UP ITEMS POST DISCHARGE  Patient will be discharged home to complete Decadron treatment as well as discharge home on oxygen and home monitoring.  He will require close follow-up with PCP as an outpatient    DISCHARGE DIAGNOSES  Principal Problem:    Acute respiratory failure due to COVID-19 (HCC) POA: Unknown  Active Problems:    Pneumonia due to COVID-19 virus POA: Unknown    Elevated ALT measurement POA: Unknown    Hyponatremia POA: Unknown    Advance care planning POA: Unknown  Resolved Problems:    * No resolved hospital problems. *      FOLLOW UP  Future Appointments   Date Time Provider Department Center   10/7/2021  2:30 PM CHRISTIE English     No follow-up provider specified.    MEDICATIONS ON DISCHARGE     Medication List      CHANGE how you take these medications      Instructions   acetaminophen 500 MG Tabs  What changed: when to take this  Commonly known as: TYLENOL   Take 2 Tablets by mouth every 8 hours as needed for Moderate Pain.  Dose: 1,000 mg        CONTINUE taking these medications      Instructions   benzonatate 100 MG Caps  Commonly known as: TESSALON   Take 1 Capsule by mouth 3 times a day as needed for Cough.  Dose: 100 mg     dexamethasone 4 MG Tabs  Commonly known as: DECADRON   Take 1.5 Tablets by mouth every day for 6 days.  Dose: 6 mg     VITAMIN C PO   Take 1 Tablet by mouth 2 times a day.  Dose: 1 Tablet     VITAMIN D PO   Take 1 Tablet by mouth every morning.  Dose: 1 Tablet     ZINC PO   Take 1 Tablet by mouth every morning.  Dose: 1 Tablet        STOP taking these medications     ibuprofen 200 MG Tabs  Commonly known as: MOTRIN     ondansetron 4 MG Tbdp  Commonly known as: ZOFRAN ODT            Allergies  Allergies   Allergen Reactions   • Peach Flavor Rash and Itching     Itching & Rash         DIET  Orders Placed This Encounter   Procedures   • Diet Order Diet: Regular     Standing Status:   Standing     Number of Occurrences:   1     Order Specific Question:   Diet:     Answer:   Regular [1]       ACTIVITY  As tolerated.  Weight bearing as tolerated    CONSULTATIONS  None    PROCEDURES      DX-CHEST-PORTABLE (1 VIEW)   Final Result      Again seen patchy bilateral infiltrates consistent with: pneumonia.          LABORATORY  Lab Results   Component Value Date    SODIUM 135 10/03/2021    POTASSIUM 4.3 10/03/2021    CHLORIDE 105 10/03/2021    CO2 19 (L) 10/03/2021    GLUCOSE 104 (H) 10/03/2021    BUN 18 10/03/2021    CREATININE 0.57 10/03/2021        Lab Results   Component Value Date    WBC 6.3 10/03/2021    HEMOGLOBIN 16.9 10/03/2021    HEMATOCRIT 49.8 10/03/2021    PLATELETCT 174 10/03/2021        Total time of the discharge process exceeds 35 minutes.

## 2021-10-04 NOTE — DISCHARGE PLANNING
Anticipated Discharge Disposition: Home with home O2      Action/Information: SW met with pt for home O2 choice. Pt agreeable to send to several agencies whom are contracted with his insurance with no preference for 1st choice, aside from whom is able to deliver O2 quickest. Pt agreeable to send referrals to [facility], and [facility]. SW faxed choice form to [facility].      Barriers: Awaiting home O2 acceptance/delivery.      Plan: HCM will continue to collaborate with pt/family, medical care team, and outpatient providers for ongoing discharge planning support and collaboration.

## 2021-10-04 NOTE — TELEPHONE ENCOUNTER
New start for [facility] Remote Monitoring program. Will continue to monitor and call if patient alerts or disconnects.    Current vitals: HR: 64, RR: 30, O2: 97% on 4L

## 2021-10-04 NOTE — CARE PLAN
The patient is Stable - Low risk of patient condition declining or worsening    Shift Goals  Clinical Goals: Arrange home oxygen set-up  Patient Goals: Discharge home, ambulate  Family Goals: Receive update    Progress made toward(s) clinical / shift goals:  Yes    Patient is not progressing towards the following goals: N/A    Problem: Knowledge Deficit - Standard  Goal: Patient and family/care givers will demonstrate understanding of plan of care, disease process/condition, diagnostic tests and medications  Outcome: Progressing

## 2021-10-04 NOTE — RESPIRATORY CARE
REMOTE MONITORING PROGRAM by RESPIRATORY THERAPY  10/4/2021 at 11:36 AM by Alesia Benavides, RRT     Patient interviewed by RT. Patient agrees to Remote Monitoring program. Device instruction performed with welcome packet, consent signed and placed at bedside chart. Patient instructed on how to use MyChart and Zoom. No questions at this time.     Flyer and education performed on remote monitoring at the bedside.

## 2021-10-04 NOTE — PROGRESS NOTES
Assumed report and patient care from SIMON Solorio. Patient is resting comfortably in bed in no signs of pain or distress. No questions or concerns at this time. Safety measures in place, call light within reach.     covid-19 surge in effect

## 2021-10-05 NOTE — PROGRESS NOTES
Patient discharged to home by vehicle escorted by hospital staff. Discharge teaching completed at bedside and patient signature obtained. All questions and concerns addressed. Tele box and PIV removed. Home oxygen reviewed and educated on, as well as home monitoring. Safety measures in place during transport.

## 2021-10-07 ENCOUNTER — OFFICE VISIT (OUTPATIENT)
Dept: MEDICAL GROUP | Facility: MEDICAL CENTER | Age: 36
End: 2021-10-07
Payer: COMMERCIAL

## 2021-10-07 VITALS
BODY MASS INDEX: 33.74 KG/M2 | HEART RATE: 84 BPM | TEMPERATURE: 98.3 F | SYSTOLIC BLOOD PRESSURE: 120 MMHG | OXYGEN SATURATION: 96 % | DIASTOLIC BLOOD PRESSURE: 70 MMHG | HEIGHT: 67 IN | WEIGHT: 215 LBS

## 2021-10-07 DIAGNOSIS — F17.290 CIGAR SMOKER: ICD-10-CM

## 2021-10-07 DIAGNOSIS — E66.9 OBESITY (BMI 30-39.9): ICD-10-CM

## 2021-10-07 DIAGNOSIS — U07.1 PNEUMONIA DUE TO COVID-19 VIRUS: ICD-10-CM

## 2021-10-07 DIAGNOSIS — Z78.9 ALCOHOL USE: ICD-10-CM

## 2021-10-07 DIAGNOSIS — J96.00 ACUTE RESPIRATORY FAILURE DUE TO COVID-19 (HCC): ICD-10-CM

## 2021-10-07 DIAGNOSIS — Z99.81 ON SUPPLEMENTAL OXYGEN THERAPY: ICD-10-CM

## 2021-10-07 DIAGNOSIS — R74.01 ELEVATED ALT MEASUREMENT: ICD-10-CM

## 2021-10-07 DIAGNOSIS — U07.1 ACUTE RESPIRATORY FAILURE DUE TO COVID-19 (HCC): ICD-10-CM

## 2021-10-07 DIAGNOSIS — J12.82 PNEUMONIA DUE TO COVID-19 VIRUS: ICD-10-CM

## 2021-10-07 DIAGNOSIS — Z00.00 HEALTHCARE MAINTENANCE: ICD-10-CM

## 2021-10-07 DIAGNOSIS — K76.0 FATTY LIVER: ICD-10-CM

## 2021-10-07 DIAGNOSIS — Z09 HOSPITAL DISCHARGE FOLLOW-UP: ICD-10-CM

## 2021-10-07 DIAGNOSIS — Z76.89 ENCOUNTER TO ESTABLISH CARE WITH NEW DOCTOR: ICD-10-CM

## 2021-10-07 PROBLEM — E87.1 HYPONATREMIA: Status: RESOLVED | Noted: 2021-10-02 | Resolved: 2021-10-07

## 2021-10-07 PROCEDURE — 99204 OFFICE O/P NEW MOD 45 MIN: CPT | Performed by: STUDENT IN AN ORGANIZED HEALTH CARE EDUCATION/TRAINING PROGRAM

## 2021-10-07 SDOH — ECONOMIC STABILITY: TRANSPORTATION INSECURITY
IN THE PAST 12 MONTHS, HAS THE LACK OF TRANSPORTATION KEPT YOU FROM MEDICAL APPOINTMENTS OR FROM GETTING MEDICATIONS?: NO

## 2021-10-07 SDOH — HEALTH STABILITY: PHYSICAL HEALTH: ON AVERAGE, HOW MANY DAYS PER WEEK DO YOU ENGAGE IN MODERATE TO STRENUOUS EXERCISE (LIKE A BRISK WALK)?: 2 DAYS

## 2021-10-07 SDOH — ECONOMIC STABILITY: HOUSING INSECURITY
IN THE LAST 12 MONTHS, WAS THERE A TIME WHEN YOU DID NOT HAVE A STEADY PLACE TO SLEEP OR SLEPT IN A SHELTER (INCLUDING NOW)?: NO

## 2021-10-07 SDOH — ECONOMIC STABILITY: TRANSPORTATION INSECURITY
IN THE PAST 12 MONTHS, HAS LACK OF RELIABLE TRANSPORTATION KEPT YOU FROM MEDICAL APPOINTMENTS, MEETINGS, WORK OR FROM GETTING THINGS NEEDED FOR DAILY LIVING?: NO

## 2021-10-07 SDOH — ECONOMIC STABILITY: INCOME INSECURITY: IN THE LAST 12 MONTHS, WAS THERE A TIME WHEN YOU WERE NOT ABLE TO PAY THE MORTGAGE OR RENT ON TIME?: NO

## 2021-10-07 SDOH — ECONOMIC STABILITY: FOOD INSECURITY: WITHIN THE PAST 12 MONTHS, YOU WORRIED THAT YOUR FOOD WOULD RUN OUT BEFORE YOU GOT MONEY TO BUY MORE.: NEVER TRUE

## 2021-10-07 SDOH — ECONOMIC STABILITY: FOOD INSECURITY: WITHIN THE PAST 12 MONTHS, THE FOOD YOU BOUGHT JUST DIDN'T LAST AND YOU DIDN'T HAVE MONEY TO GET MORE.: NEVER TRUE

## 2021-10-07 SDOH — ECONOMIC STABILITY: INCOME INSECURITY: HOW HARD IS IT FOR YOU TO PAY FOR THE VERY BASICS LIKE FOOD, HOUSING, MEDICAL CARE, AND HEATING?: NOT HARD AT ALL

## 2021-10-07 SDOH — HEALTH STABILITY: MENTAL HEALTH
STRESS IS WHEN SOMEONE FEELS TENSE, NERVOUS, ANXIOUS, OR CAN'T SLEEP AT NIGHT BECAUSE THEIR MIND IS TROUBLED. HOW STRESSED ARE YOU?: ONLY A LITTLE

## 2021-10-07 SDOH — HEALTH STABILITY: PHYSICAL HEALTH: ON AVERAGE, HOW MANY MINUTES DO YOU ENGAGE IN EXERCISE AT THIS LEVEL?: 20 MIN

## 2021-10-07 SDOH — ECONOMIC STABILITY: HOUSING INSECURITY

## 2021-10-07 SDOH — ECONOMIC STABILITY: TRANSPORTATION INSECURITY
IN THE PAST 12 MONTHS, HAS LACK OF TRANSPORTATION KEPT YOU FROM MEETINGS, WORK, OR FROM GETTING THINGS NEEDED FOR DAILY LIVING?: NO

## 2021-10-07 ASSESSMENT — LIFESTYLE VARIABLES
HOW OFTEN DO YOU HAVE SIX OR MORE DRINKS ON ONE OCCASION: MONTHLY
HOW MANY STANDARD DRINKS CONTAINING ALCOHOL DO YOU HAVE ON A TYPICAL DAY: 5 OR 6
HOW OFTEN DO YOU HAVE A DRINK CONTAINING ALCOHOL: 2-3 TIMES A WEEK

## 2021-10-07 ASSESSMENT — SOCIAL DETERMINANTS OF HEALTH (SDOH)
DO YOU BELONG TO ANY CLUBS OR ORGANIZATIONS SUCH AS CHURCH GROUPS UNIONS, FRATERNAL OR ATHLETIC GROUPS, OR SCHOOL GROUPS?: NO
HOW OFTEN DO YOU ATTENT MEETINGS OF THE CLUB OR ORGANIZATION YOU BELONG TO?: NEVER
WITHIN THE PAST 12 MONTHS, YOU WORRIED THAT YOUR FOOD WOULD RUN OUT BEFORE YOU GOT THE MONEY TO BUY MORE: NEVER TRUE
HOW OFTEN DO YOU ATTEND CHURCH OR RELIGIOUS SERVICES?: MORE THAN 4 TIMES PER YEAR
HOW OFTEN DO YOU HAVE SIX OR MORE DRINKS ON ONE OCCASION: MONTHLY
IN A TYPICAL WEEK, HOW MANY TIMES DO YOU TALK ON THE PHONE WITH FAMILY, FRIENDS, OR NEIGHBORS?: TWICE A WEEK
HOW OFTEN DO YOU GET TOGETHER WITH FRIENDS OR RELATIVES?: ONCE A WEEK
IN A TYPICAL WEEK, HOW MANY TIMES DO YOU TALK ON THE PHONE WITH FAMILY, FRIENDS, OR NEIGHBORS?: TWICE A WEEK
HOW OFTEN DO YOU HAVE A DRINK CONTAINING ALCOHOL: 2-3 TIMES A WEEK
HOW OFTEN DO YOU GET TOGETHER WITH FRIENDS OR RELATIVES?: ONCE A WEEK
DO YOU BELONG TO ANY CLUBS OR ORGANIZATIONS SUCH AS CHURCH GROUPS UNIONS, FRATERNAL OR ATHLETIC GROUPS, OR SCHOOL GROUPS?: NO
HOW OFTEN DO YOU ATTENT MEETINGS OF THE CLUB OR ORGANIZATION YOU BELONG TO?: NEVER
HOW MANY DRINKS CONTAINING ALCOHOL DO YOU HAVE ON A TYPICAL DAY WHEN YOU ARE DRINKING: 5 OR 6
HOW HARD IS IT FOR YOU TO PAY FOR THE VERY BASICS LIKE FOOD, HOUSING, MEDICAL CARE, AND HEATING?: NOT HARD AT ALL
HOW OFTEN DO YOU ATTEND CHURCH OR RELIGIOUS SERVICES?: MORE THAN 4 TIMES PER YEAR

## 2021-10-07 ASSESSMENT — FIBROSIS 4 INDEX: FIB4 SCORE: 0.94

## 2021-10-07 ASSESSMENT — PATIENT HEALTH QUESTIONNAIRE - PHQ9
CLINICAL INTERPRETATION OF PHQ2 SCORE: 2
5. POOR APPETITE OR OVEREATING: 3 - NEARLY EVERY DAY
SUM OF ALL RESPONSES TO PHQ QUESTIONS 1-9: 17

## 2021-10-07 NOTE — PROGRESS NOTES
Subjective:     CC:  Diagnoses of Acute respiratory failure due to COVID-19 (HCC), On supplemental oxygen therapy, Pneumonia due to COVID-19 virus, Elevated ALT measurement, Fatty liver, Obesity (BMI 30-39.9), Cigar smoker, Alcohol use, Healthcare maintenance, Hospital discharge follow-up, and Encounter to establish care with new doctor were pertinent to this visit.    HISTORY OF THE PRESENT ILLNESS: Patient is a 36 y.o. male. This pleasant patient is here today to establish care and hospital discharge f/u. He reports no prior PCP.    Today, pt states he feels better since going home and has been trying to remain active. He has been able to eat. Normal urination and BM.    Patient was recently discharged on 10/4/21 for acute resp failure 2/2 COVID PNA: COVID+ on 9/28/21, unvaccinated, recent hospital stay 10/2-10/4/21 required up to 4L O2; 09/29/21 CTA chest without PE; discharged home with 4L O2, unvaccinated. He is currently completing the Decadron 4mg which has 5 days left. He is taking Tessalon 100mg prn cough which works well. He is taking vit C, vit D, zinc supplements daily. He is sleeping okay but would like to try some melatonin. He takes Tylenol as needed for headaches which works well. Appetite is good and he got his taste back so he has been eating well. He is able to focus better now. Denies depression. He is eager to get back to work soon so he is hoping to get off oxygen soon and he would like to f/u next week to see how he is doing with oxygen therapy.    10/3/21: AST 53,     Obesity: eats regularly carbs, tortilla, tacos, pasta, junk food, walk a lot at work, works as superintendent for construction work    Patient was told that he has fatty liver 5 years ago.    HM: he never get the flu vaccine in the past but plan on getting COVID and pneumococcal vacccines once he feels better    SH: prior to COVID infection he smokes cigar 1-2x/month since 2018, no current use, denies illicit drug use,  "drinks about 4-5 cans of beer 3-5 days of the week but no current use. 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    No problem-specific Assessment & Plan notes found for this encounter.      Current Outpatient Medications Ordered in Epic   Medication Sig Dispense Refill   • dexamethasone (DECADRON) 4 MG Tab Take 1.5 Tablets by mouth every day for 6 days. 9 Tablet 0   • acetaminophen (TYLENOL) 500 MG Tab Take 2 Tablets by mouth every 8 hours as needed for Moderate Pain. 30 Tablet 0   • Ascorbic Acid (VITAMIN C PO) Take 1 Tablet by mouth 2 times a day.     • VITAMIN D PO Take 1 Tablet by mouth every morning.     • Multiple Vitamins-Minerals (ZINC PO) Take 1 Tablet by mouth every morning.     • benzonatate (TESSALON) 100 MG Cap Take 1 Capsule by mouth 3 times a day as needed for Cough. 60 Capsule 0     No current Epic-ordered facility-administered medications on file.       Health Maintenance: COVID, pneumococcal and flu vaccines recommended    ROS:   Gen: no fevers/chills, + weight loss after infection  Eyes: no changes in vision  ENT: no sore throat, no hearing loss  Pulm: + sob with exertion, no cough  CV: no chest pain, no palpitations  GI: no nausea/vomiting, no diarrhea  : no dysuria  MSk: no myalgias  Skin: no rash  Neuro: no headaches, no numbness/tingling      Objective:     Exam: /70 (BP Location: Right arm, Patient Position: Sitting, BP Cuff Size: Large adult)   Pulse 84   Temp 36.8 °C (98.3 °F) (Temporal)   Ht 1.702 m (5' 7\")   Wt 97.5 kg (215 lb)   SpO2 96%  Body mass index is 33.67 kg/m².    General: Normal appearing, obese. No distress. Wearing nasal cannula.  HEENT: Normocephalic. Eyes conjunctiva clear lids without ptosis, pupils equal and reactive to light accommodation, ears normal shape and contour, canals are clear bilaterally, tympanic membranes are benign, nasal mucosa benign, oropharynx is without erythema, edema or exudates.   Neck: Supple without JVD or bruit. Thyroid " is not enlarged.  Pulmonary: Clear to ausculation.  Normal effort. No rales, ronchi, or wheezing.  Cardiovascular: Regular rate and rhythm without murmur. Carotid and radial pulses are intact and equal bilaterally.  Abdomen: Soft, nontender, nondistended. Normal bowel sounds. Liver and spleen are not palpable  Neurologic: Grossly nonfocal  Skin: Warm and dry.  No obvious lesions.  Musculoskeletal: Normal gait. No extremity cyanosis, clubbing, or edema.  Psych: Normal mood and affect. Alert and oriented x3. Judgment and insight is normal.      Assessment & Plan:   36 y.o. male with the following -    1. Acute respiratory failure due to COVID-19 (HCC)  Clinically improving, we were able to wean down to 2L O2 today and resting O2 remained at 96%.  - cont supplemental O2, wean as tolerated, advised patient to trial walking around with lower O2 level and monitor O2 sat at home to maintain SpO2 >90%  - cont remaining course of Decadron 4mg daily  - advised to trial slow return to normal activity as tolerated  - f/u 1 week for recheck and reassess need for supplemental O2 therapy    2. Pneumonia due to COVID-19 virus  - cont remaining course of Decadron 4mg qd  - cont supplement with vit C, vit D, and zinc; may add on Melatonin 5-10mg qhs before bedtime to help with sleep if needed  - cont Tylenol prn headache/body aches/fever and Tessalon prn cough    3. Elevated ALT measurement  Likely acute on chronic given recent infection and fatty liver in the past, unknown baseline at this time  - will recheck CMP in a few weeks to see if numbers normalize further  - Comp Metabolic Panel; Future    4. Fatty liver  - monitor liver function  - screen for hep C  - advised healthy eating and regular exercise after recovered from acute infection    5. Obesity (BMI 30-39.9)  BMI 33   - f/u CMP, HbA1C, lipid panel, thyroid panel   - discussed lifestyle modifications including weight loss (lose 5-10% of current body weight, no more than 2  pounds per week), healthy diet (eat regular meals with a variety of food, limit daily intake of saturated fat and sodium, limit high-sugar foods, avoid sodas and alcohol, and stay physically active (at least 30 minutes of moderate-intensity physical activity 3-5x/week)   - Comp Metabolic Panel; Future  - HEMOGLOBIN A1C; Future  - Lipid Profile; Future  - TSH WITH REFLEX TO FT4; Future    6. Cigar smoker  Patient has not smoked since discharge.  - advised to quit smoking  - advised to get flu, pneumococcal and COVID vaccines once feeling better    7. Alcohol use  Patient has not drank since discharge.  - advised to limit or quit alcohol use    8. Healthcare maintenance  - HEP C VIRUS ANTIBODY; Future  - HIV AG/AB COMBO ASSAY DIAGNOSTIC; Future  - advised to get flu, pneumococcal and COVID vaccines once feeling better    9. Hospital discharge follow-up    10. Encounter to establish care with new doctor          Return in about 1 week (around 10/14/2021) for COVID f/u.    Please note that this dictation was created using voice recognition software. I have made every reasonable attempt to correct obvious errors, but I expect that there are errors of grammar and possibly content that I did not discover before finalizing the note.

## 2021-10-08 ENCOUNTER — TELEPHONE (OUTPATIENT)
Dept: OTHER | Facility: MEDICAL CENTER | Age: 36
End: 2021-10-08

## 2021-10-11 ENCOUNTER — TELEPHONE (OUTPATIENT)
Dept: OTHER | Facility: MEDICAL CENTER | Age: 36
End: 2021-10-11

## 2021-10-11 VITALS — OXYGEN SATURATION: 95 % | HEART RATE: 94 BPM

## 2021-10-11 VITALS — RESPIRATION RATE: 21 BRPM | HEART RATE: 101 BPM | OXYGEN SATURATION: 96 %

## 2021-10-11 NOTE — TELEPHONE ENCOUNTER
Called him to let him know that per Dr. Mondragon he can graduate from the program. I let him know to throw away all supplies and to uninstall aston off his phone.

## 2021-10-11 NOTE — TELEPHONE ENCOUNTER
Patient disconnected at 0730. Called patient, no answer. Called back at 0740. Patient advised he needed to change out battery and would reconnect after getting ready in the AM. Patient was disconnected for 40 minutes. Patient reconnected at 0810.

## 2021-10-11 NOTE — TELEPHONE ENCOUNTER
Patient called to let us know that his wristband was flashing blue. We troubleshooted for awhile and finally had him change out his wristband and he was able to connected again. Will continue to monitor and call if her alerts or disconnects.

## 2021-10-12 ENCOUNTER — OFFICE VISIT (OUTPATIENT)
Dept: MEDICAL GROUP | Facility: MEDICAL CENTER | Age: 36
End: 2021-10-12
Payer: COMMERCIAL

## 2021-10-12 VITALS
DIASTOLIC BLOOD PRESSURE: 54 MMHG | OXYGEN SATURATION: 93 % | SYSTOLIC BLOOD PRESSURE: 96 MMHG | RESPIRATION RATE: 18 BRPM | BODY MASS INDEX: 36.07 KG/M2 | HEIGHT: 67 IN | HEART RATE: 84 BPM | WEIGHT: 229.83 LBS | TEMPERATURE: 98 F

## 2021-10-12 DIAGNOSIS — U07.1 ACUTE RESPIRATORY FAILURE DUE TO COVID-19 (HCC): ICD-10-CM

## 2021-10-12 DIAGNOSIS — U07.1 PNEUMONIA DUE TO COVID-19 VIRUS: ICD-10-CM

## 2021-10-12 DIAGNOSIS — J96.00 ACUTE RESPIRATORY FAILURE DUE TO COVID-19 (HCC): ICD-10-CM

## 2021-10-12 DIAGNOSIS — J12.82 PNEUMONIA DUE TO COVID-19 VIRUS: ICD-10-CM

## 2021-10-12 PROCEDURE — 99213 OFFICE O/P EST LOW 20 MIN: CPT | Performed by: STUDENT IN AN ORGANIZED HEALTH CARE EDUCATION/TRAINING PROGRAM

## 2021-10-12 ASSESSMENT — FIBROSIS 4 INDEX: FIB4 SCORE: 0.94

## 2021-10-12 NOTE — PROGRESS NOTES
Subjective:     CC: follow up COVID-19 infection    HPI:   Jacqueline presents today for follow up after his COVID-19 infection and requesting return to work note.    Today, he feels he is approximately 90% back to his baseline. He had a follow up appointment with pulmonology Dr. Mondragon and was told no need to follow up given his improvement. He has been able to eat well. Normal urination and BM. He has been off of the supplemental oxygen since 10/9/21 and saturating well on room air while resting or with exertion in the mid 90s. He feels ready to go back to work. He has been able to ambulate up to 40 minutes without getting short of breath. He will be working outside about 40-60% of the time.     Patient was recently discharged on 10/4/21 for acute resp failure 2/2 COVID PNA: COVID+ on 9/28/21, unvaccinated, recent hospital stay 10/2-10/4/21 required up to 4L O2; 09/29/21 CTA chest without PE; discharged home with 4L O2, unvaccinated. He already completed the Decadron 4mg course. He is no longer taking Tessalon 100mg prn cough as it's not bothering him anymore. He is still taking vit C, vit D, zinc supplements daily. He is sleeping well. He has not needed to take Tylenol as needed for headaches. Appetite is good. Denies fever, dizziness, cough, CP, SOB, depression.     Problem   Acute Respiratory Failure Due to Covid-19 (Hcc) (Resolved)   Pneumonia Due to Covid-19 Virus (Resolved)       Current Outpatient Medications Ordered in Epic   Medication Sig Dispense Refill   • acetaminophen (TYLENOL) 500 MG Tab Take 2 Tablets by mouth every 8 hours as needed for Moderate Pain. 30 Tablet 0   • Ascorbic Acid (VITAMIN C PO) Take 1 Tablet by mouth 2 times a day.     • VITAMIN D PO Take 1 Tablet by mouth every morning.     • Multiple Vitamins-Minerals (ZINC PO) Take 1 Tablet by mouth every morning.       No current [facility]-ordered facility-administered medications on file.       Health Maintenance: reviewed and discussed with  "patient    ROS:  Gen: no fevers/chills, no changes in weight  ENT: no sore throat, no hearing loss, no bloody nose  Pulm: no sob, no cough  CV: no chest pain, no palpitations  GI: no nausea/vomiting, no diarrhea  MSk: no myalgias  Neuro: no headaches, no numbness/tingling      Objective:     Exam:  BP (!) 96/54 (BP Location: Right arm, Patient Position: Sitting, BP Cuff Size: Adult)   Pulse 84   Temp 36.7 °C (98 °F) (Temporal)   Resp 18   Ht 1.689 m (5' 6.5\")   Wt 104 kg (229 lb 13.3 oz)   SpO2 93%   BMI 36.54 kg/m²  Body mass index is 36.54 kg/m².    Recheck BP sitting left upper arm 108/64    General: Normal appearing, obese. No distress.  HEENT: MMM   Pulmonary: Clear to ausculation.  Normal effort. No rales, ronchi, or wheezing.  Cardiovascular: Regular rate and rhythm without murmur. Carotid and radial pulses are intact and equal bilaterally.  Abdomen: Soft, nontender, nondistended. Normal bowel sounds. Liver and spleen are not palpable  Skin: Warm and dry.  No obvious lesions.  Psych: Normal mood and affect. Alert and oriented x3. Judgment and insight is normal.      Assessment & Plan:     36 y.o. male with the following -     Problem List Items Addressed This Visit     RESOLVED: Acute respiratory failure due to COVID-19 (HCC)    Much improvement since last visit. He has been off of supplemental O2 since 10/09/2021 with O2 sat above 90s and asymptomatic since. Vitals and exam reassuring. He was seen by pulm Dr. Mondragon yesterday and no need to follow up given his improvement.  - note for return to work tomorrow provided  - advised slow return to normal activity as tolerated   - may cont supplement with vit C, vit D, and zinc; may add on Melatonin 5-10mg qhs before bedtime to help with sleep if needed   - Tylenol prn headache/body aches/fever and Tessalon prn cough  - f/u as needed  - pt advised to f/u 4 weeks for lab results ordered last visit and he prefers to call to schedule his next appt      " RESOLVED: Pneumonia due to COVID-19 virus    - recommended to get flu, COVID-19 and pneumococcal vaccines when able to            Return in about 4 weeks (around 11/9/2021) for lab results.    Please note that this dictation was created using voice recognition software. I have made every reasonable attempt to correct obvious errors, but I expect that there are errors of grammar and possibly content that I did not discover before finalizing the note.

## 2021-10-12 NOTE — LETTER
October 12, 2021    To Whom It May Concern:         This is confirmation that Jacqueline Atkinson attended his scheduled appointment with Ralph Tucker D.O. on 10/12/21. He is released to return to work starting 10/13/2021.         If you have any questions please do not hesitate to call me at the phone number listed below.    Sincerely,          Ralph Tucker D.O.          [phone]

## 2021-12-14 ENCOUNTER — HOSPITAL ENCOUNTER (OUTPATIENT)
Dept: LAB | Facility: MEDICAL CENTER | Age: 36
End: 2021-12-14
Attending: STUDENT IN AN ORGANIZED HEALTH CARE EDUCATION/TRAINING PROGRAM
Payer: COMMERCIAL

## 2021-12-14 DIAGNOSIS — R74.01 ELEVATED ALT MEASUREMENT: ICD-10-CM

## 2021-12-14 DIAGNOSIS — Z00.00 HEALTHCARE MAINTENANCE: ICD-10-CM

## 2021-12-14 DIAGNOSIS — E66.9 OBESITY (BMI 30-39.9): ICD-10-CM

## 2021-12-14 LAB
ALBUMIN SERPL BCP-MCNC: 4.7 G/DL (ref 3.2–4.9)
ALBUMIN/GLOB SERPL: 2 G/DL
ALP SERPL-CCNC: 93 U/L (ref 30–99)
ALT SERPL-CCNC: 149 U/L (ref 2–50)
ANION GAP SERPL CALC-SCNC: 15 MMOL/L (ref 7–16)
AST SERPL-CCNC: 59 U/L (ref 12–45)
BILIRUB SERPL-MCNC: 0.8 MG/DL (ref 0.1–1.5)
BUN SERPL-MCNC: 12 MG/DL (ref 8–22)
CALCIUM SERPL-MCNC: 9.2 MG/DL (ref 8.4–10.2)
CHLORIDE SERPL-SCNC: 103 MMOL/L (ref 96–112)
CHOLEST SERPL-MCNC: 177 MG/DL (ref 100–199)
CO2 SERPL-SCNC: 23 MMOL/L (ref 20–33)
CREAT SERPL-MCNC: 0.73 MG/DL (ref 0.5–1.4)
EST. AVERAGE GLUCOSE BLD GHB EST-MCNC: 91 MG/DL
GLOBULIN SER CALC-MCNC: 2.4 G/DL (ref 1.9–3.5)
GLUCOSE SERPL-MCNC: 87 MG/DL (ref 65–99)
HBA1C MFR BLD: 4.8 % (ref 4–5.6)
HCV AB SER QL: NORMAL
HDLC SERPL-MCNC: 29 MG/DL
HIV 1+2 AB+HIV1 P24 AG SERPL QL IA: NORMAL
LDLC SERPL CALC-MCNC: 119 MG/DL
POTASSIUM SERPL-SCNC: 3.9 MMOL/L (ref 3.6–5.5)
PROT SERPL-MCNC: 7.1 G/DL (ref 6–8.2)
SODIUM SERPL-SCNC: 141 MMOL/L (ref 135–145)
TRIGL SERPL-MCNC: 145 MG/DL (ref 0–149)
TSH SERPL DL<=0.005 MIU/L-ACNC: 1.45 UIU/ML (ref 0.38–5.33)

## 2021-12-14 PROCEDURE — 86803 HEPATITIS C AB TEST: CPT

## 2021-12-14 PROCEDURE — 36415 COLL VENOUS BLD VENIPUNCTURE: CPT

## 2021-12-14 PROCEDURE — 80061 LIPID PANEL: CPT

## 2021-12-14 PROCEDURE — 80053 COMPREHEN METABOLIC PANEL: CPT

## 2021-12-14 PROCEDURE — G0475 HIV COMBINATION ASSAY: HCPCS

## 2021-12-14 PROCEDURE — 84443 ASSAY THYROID STIM HORMONE: CPT

## 2021-12-14 PROCEDURE — 83036 HEMOGLOBIN GLYCOSYLATED A1C: CPT

## 2021-12-17 PROBLEM — E78.5 ATHEROGENIC DYSLIPIDEMIA: Status: ACTIVE | Noted: 2021-12-17

## 2021-12-18 NOTE — PROGRESS NOTES
"Subjective:     CC: labs f/u    HPI:   Jacqueline presents today for labs f/u.    He will be moving back to [place] in 04/2022 and would like to follow up in 3 months to recheck his blood tests at that time.    Problem   Throat Discomfort    Subacute issue for 2 months since his recent COVID. He gets a throat discomfort with certain neck movement or laughing causes him to cough. Denies fever, headache, runny nose, sore throat, chest pain, shortness of breath.     Atherogenic Dyslipidemia    12/2021 HDL 29, .     Obesity (Bmi 30-39.9)    Chronic condition. He tries to eat healthy in general. He does not regularly exercise outside of work.     Fatty Liver    Chronic condition. He would like to get interval ultrasound to check his liver.         Current Outpatient Medications Ordered in Epic   Medication Sig Dispense Refill   • albuterol 108 (90 Base) MCG/ACT Aero Soln inhalation aerosol Inhale 1-2 Puffs every four hours as needed for Shortness of Breath. 1 Each 3   • acetaminophen (TYLENOL) 500 MG Tab Take 2 Tablets by mouth every 8 hours as needed for Moderate Pain. 30 Tablet 0   • Ascorbic Acid (VITAMIN C PO) Take 1 Tablet by mouth 2 times a day.     • VITAMIN D PO Take 1 Tablet by mouth every morning.     • Multiple Vitamins-Minerals (ZINC PO) Take 1 Tablet by mouth every morning.       No current [facility]-ordered facility-administered medications on file.       Health Maintenance: reviewed and discussed with patient  Vaccines: due for flu, PPSV23, COVID-19, Tdap received 07/2016    ROS:  Gen: no fevers/chills, no changes in weight  ENT: no sore throat, + throat discomfort  Pulm: no sob, no cough  CV: no chest pain, no palpitations  GI: no nausea/vomiting, no diarrhea  MSk: no myalgias  Skin: no rash  Neuro: no headaches    Objective:     Exam:  /78 (BP Location: Left arm, Patient Position: Sitting, BP Cuff Size: Large adult)   Pulse 86   Temp 37 °C (98.6 °F) (Temporal)   Resp 16   Ht 1.689 m (5' 6.5\")  "  Wt 108 kg (238 lb 5.1 oz)   SpO2 96%   BMI 37.89 kg/m²  Body mass index is 37.89 kg/m².    Gen: Alert and oriented, No apparent distress.  Neck: Neck is supple without lymphadenopathy.  ENT:    Normal oropharynx.  Lungs: Normal effort, CTA bilaterally, no wheezes, rhonchi, or rales  CV: Regular rate and rhythm. No murmurs, rubs, or gallops.  Ext: No clubbing, cyanosis, edema.    Labs:   Lab Results   Component Value Date/Time    SODIUM 141 12/14/2021 05:12 PM    POTASSIUM 3.9 12/14/2021 05:12 PM    CHLORIDE 103 12/14/2021 05:12 PM    CO2 23 12/14/2021 05:12 PM    ANION 15.0 12/14/2021 05:12 PM    GLUCOSE 87 12/14/2021 05:12 PM    BUN 12 12/14/2021 05:12 PM    CREATININE 0.73 12/14/2021 05:12 PM    CALCIUM 9.2 12/14/2021 05:12 PM    ASTSGOT 59 (H) 12/14/2021 05:12 PM    ALTSGPT 149 (H) 12/14/2021 05:12 PM    TBILIRUBIN 0.8 12/14/2021 05:12 PM    ALBUMIN 4.7 12/14/2021 05:12 PM    TOTPROTEIN 7.1 12/14/2021 05:12 PM    GLOBULIN 2.4 12/14/2021 05:12 PM    AGRATIO 2.0 12/14/2021 05:12 PM     Lab Results   Component Value Date/Time    HBA1C 4.8 12/14/2021 05:12 PM      Lab Results   Component Value Date/Time    CHOLSTRLTOT 177 12/14/2021 1712    TRIGLYCERIDE 145 12/14/2021 1712    HDL 29 (A) 12/14/2021 1712     (H) 12/14/2021 1712     Lab Results   Component Value Date/Time    TSHULTRASEN 1.450 12/14/2021 1712     Assessment & Plan:     36 y.o. male with the following -     1. Throat discomfort  Subacute issue, uncontrolled. Suspect bronchospasm sequelae from prior COVID infection.  - trial albuterol inhaler as needed per order  - albuterol 108 (90 Base) MCG/ACT Aero Soln inhalation aerosol; Inhale 1-2 Puffs every four hours as needed for Shortness of Breath.  Dispense: 1 Each; Refill: 3    2. Fatty liver  Chronic condition, stable.   - f/u liver US for interval monitoring  - Comp Metabolic Panel; Future  - Lipid Profile; Future  - US-RUQ; Future    3. Atherogenic dyslipidemia  New condition.  - advised to  maintain a healthy weight, regular aerobic exercise, and eating diets lower in saturated fats  - Lipid Profile; Future    4. Obesity (BMI 30-39.9)  Body mass index is 37.89 kg/m².  - f/u CMP, lipid panel  - discussed lifestyle modifications including weight loss (lose 5-10% of current body weight, no more than 2 pounds per week), healthy diet (eat regular meals with a variety of food, limit daily intake of saturated fat and sodium, limit high-sugar foods, avoid sodas and alcohol, and stay physically active (at least 30 minutes of moderate-intensity physical activity 3-5x/week)   - Comp Metabolic Panel; Future  - Lipid Profile; Future    Return in about 3 months (around 3/21/2022) for lab results.    Please note that this dictation was created using voice recognition software. I have made every reasonable attempt to correct obvious errors, but I expect that there are errors of grammar and possibly content that I did not discover before finalizing the note.

## 2021-12-21 ENCOUNTER — OFFICE VISIT (OUTPATIENT)
Dept: MEDICAL GROUP | Facility: MEDICAL CENTER | Age: 36
End: 2021-12-21
Payer: COMMERCIAL

## 2021-12-21 VITALS
RESPIRATION RATE: 16 BRPM | DIASTOLIC BLOOD PRESSURE: 78 MMHG | TEMPERATURE: 98.6 F | SYSTOLIC BLOOD PRESSURE: 126 MMHG | HEART RATE: 86 BPM | OXYGEN SATURATION: 96 % | WEIGHT: 238.32 LBS | BODY MASS INDEX: 37.4 KG/M2 | HEIGHT: 67 IN

## 2021-12-21 DIAGNOSIS — K76.0 FATTY LIVER: ICD-10-CM

## 2021-12-21 DIAGNOSIS — E78.5 ATHEROGENIC DYSLIPIDEMIA: ICD-10-CM

## 2021-12-21 DIAGNOSIS — E66.9 OBESITY (BMI 30-39.9): ICD-10-CM

## 2021-12-21 DIAGNOSIS — R07.0 THROAT DISCOMFORT: ICD-10-CM

## 2021-12-21 PROCEDURE — 99214 OFFICE O/P EST MOD 30 MIN: CPT | Performed by: STUDENT IN AN ORGANIZED HEALTH CARE EDUCATION/TRAINING PROGRAM

## 2021-12-21 RX ORDER — ALBUTEROL SULFATE 90 UG/1
1-2 AEROSOL, METERED RESPIRATORY (INHALATION) EVERY 4 HOURS PRN
Qty: 1 EACH | Refills: 3 | Status: SHIPPED | OUTPATIENT
Start: 2021-12-21 | End: 2022-04-19

## 2021-12-21 ASSESSMENT — FIBROSIS 4 INDEX: FIB4 SCORE: 1.03

## 2022-03-21 NOTE — PROGRESS NOTES
Subjective:     CC: follow up    HPI:   Jacqueline presents today for follow up    No problems updated.    Current Outpatient Medications Ordered in Epic   Medication Sig Dispense Refill   • albuterol 108 (90 Base) MCG/ACT Aero Soln inhalation aerosol Inhale 1-2 Puffs every four hours as needed for Shortness of Breath. 1 Each 3   • acetaminophen (TYLENOL) 500 MG Tab Take 2 Tablets by mouth every 8 hours as needed for Moderate Pain. 30 Tablet 0   • Ascorbic Acid (VITAMIN C PO) Take 1 Tablet by mouth 2 times a day.     • VITAMIN D PO Take 1 Tablet by mouth every morning.     • Multiple Vitamins-Minerals (ZINC PO) Take 1 Tablet by mouth every morning.       No current [facility]-ordered facility-administered medications on file.       Health Maintenance: reviewed and discussed with patient  Vaccines: due for flu, PPSV23, COVID-19, Tdap received 07/2016     ROS:  Gen: no fevers/chills, no changes in weight  ENT: no sore throat, + throat discomfort  Pulm: no sob, no cough  CV: no chest pain, no palpitations  GI: no nausea/vomiting, no diarrhea  MSk: no myalgias  Skin: no rash  Neuro: no headaches      Objective:     Exam:  There were no vitals taken for this visit. There is no height or weight on file to calculate BMI.    Gen:    Alert and oriented, No apparent distress.  Neck:   Neck is supple without lymphadenopathy.  ENT:    Normal oropharynx.  Lungs: Normal effort, CTA bilaterally, no wheezes, rhonchi, or rales  CV:      Regular rate and rhythm. No murmurs, rubs, or gallops.  Ext:      No clubbing, cyanosis, edema.    Labs: ***    Assessment & Plan:     36 y.o. male with the following -     There are no diagnoses linked to this encounter.    No follow-ups on file.    Please note that this dictation was created using voice recognition software. I have made every reasonable attempt to correct obvious errors, but I expect that there are errors of grammar and possibly content that I did not discover before finalizing the  note.

## 2022-03-25 ENCOUNTER — APPOINTMENT (OUTPATIENT)
Dept: MEDICAL GROUP | Facility: MEDICAL CENTER | Age: 37
End: 2022-03-25
Payer: COMMERCIAL

## 2022-04-18 PROBLEM — M79.641 RIGHT HAND PAIN: Status: ACTIVE | Noted: 2022-04-18

## 2022-04-18 NOTE — PROGRESS NOTES
"Subjective:     CC: right hand pain    HPI:   Jacqueline presents today for right hand pain    Problem   Pain of Right Thumb    Acute issue. He reports he does carry his toddler children at home frequently but otherwise no known injuries. He has tried ibuprofen as needed with minimal relief. He has been wearing a wrist brace. He would like to trial an injection today as he will be moving in a few weeks and need to use his hands.    Character: sharp pain  Onset: 3 weeks ago  Location: right thumb  Duration: intermittent  Exacerbating factors: moving thumb with extension, type  Relieving factors: wrist brace  Associated symptoms: none  Severity: 5/10         No current Epic-ordered outpatient medications on file.     No current Epic-ordered facility-administered medications on file.     Health Maintenance: reviewed  Vaccines: due for flu, PPSV23, COVID-19, Tdap received 07/2016     ROS:  Gen: no fevers/chills, no changes in weight  MSk: + right thumb pain  Skin: no rash    Objective:     Exam:  /72 (BP Location: Left arm, Patient Position: Sitting, BP Cuff Size: Adult)   Pulse 99   Temp 36.3 °C (97.4 °F) (Temporal)   Resp 16   Ht 1.689 m (5' 6.5\")   Wt 107 kg (235 lb 14.3 oz)   SpO2 94%   BMI 37.50 kg/m²  Body mass index is 37.5 kg/m².    Gen:    Alert and oriented, No apparent distress.  Ext:     + TTP to right dorsal medial aspect of 1st metacarpal bone  Skin:    No obvious skin lesions.    A therapeutic steroid injection was performed at right dorsal thumb using 1% lidocaine and 40 mg of Kenalog. This was well tolerated.    Assessment & Plan:     36 y.o. male with the following -     1. Pain of right thumb  Acute issue, persistent. Suspect soft tissue vs arthritis pain from overuse injury. Not exactly De Quervain's tenosynovitis based on his location of pain. We discussed XR imaging for evaluation but patient would like to defer for now. Patient requested steroid injection today which was done and well " tolerated.  - advised to avoid exacerbating activities, continue wrist brace, NSAIDs as needed for pain  - follow up in 4 weeks if symptoms persists or worsen  - lidocaine (XYLOCAINE) 1 % injection 0.5 mL  - triamcinolone acetonide (KENALOG-40) injection 20 mg      Return if symptoms worsen or fail to improve.    Please note that this dictation was created using voice recognition software. I have made every reasonable attempt to correct obvious errors, but I expect that there are errors of grammar and possibly content that I did not discover before finalizing the note.

## 2022-04-19 ENCOUNTER — OFFICE VISIT (OUTPATIENT)
Dept: MEDICAL GROUP | Facility: MEDICAL CENTER | Age: 37
End: 2022-04-19
Payer: COMMERCIAL

## 2022-04-19 VITALS
OXYGEN SATURATION: 94 % | DIASTOLIC BLOOD PRESSURE: 72 MMHG | BODY MASS INDEX: 37.02 KG/M2 | SYSTOLIC BLOOD PRESSURE: 124 MMHG | HEART RATE: 99 BPM | TEMPERATURE: 97.4 F | HEIGHT: 67 IN | RESPIRATION RATE: 16 BRPM | WEIGHT: 235.89 LBS

## 2022-04-19 DIAGNOSIS — M79.644 PAIN OF RIGHT THUMB: ICD-10-CM

## 2022-04-19 PROCEDURE — 20550 NJX 1 TENDON SHEATH/LIGAMENT: CPT | Performed by: STUDENT IN AN ORGANIZED HEALTH CARE EDUCATION/TRAINING PROGRAM

## 2022-04-19 RX ORDER — HYDROCODONE BITARTRATE AND ACETAMINOPHEN 5; 325 MG/1; MG/1
TABLET ORAL
COMMUNITY
Start: 2022-04-18 | End: 2022-04-19

## 2022-04-19 RX ORDER — TRIAMCINOLONE ACETONIDE 40 MG/ML
20 INJECTION, SUSPENSION INTRA-ARTICULAR; INTRAMUSCULAR ONCE
Status: COMPLETED | OUTPATIENT
Start: 2022-04-19 | End: 2022-04-19

## 2022-04-19 RX ORDER — TRIAMCINOLONE ACETONIDE 40 MG/ML
40 INJECTION, SUSPENSION INTRA-ARTICULAR; INTRAMUSCULAR ONCE
Status: DISCONTINUED | OUTPATIENT
Start: 2022-04-19 | End: 2022-04-19

## 2022-04-19 RX ADMIN — TRIAMCINOLONE ACETONIDE 40 MG: 40 INJECTION, SUSPENSION INTRA-ARTICULAR; INTRAMUSCULAR at 08:50

## 2022-04-19 RX ADMIN — Medication 0.5 ML: at 08:50

## 2022-04-19 ASSESSMENT — PATIENT HEALTH QUESTIONNAIRE - PHQ9: CLINICAL INTERPRETATION OF PHQ2 SCORE: 0

## 2022-04-19 ASSESSMENT — FIBROSIS 4 INDEX: FIB4 SCORE: 1.03

## 2023-10-25 ENCOUNTER — DOCUMENTATION (OUTPATIENT)
Dept: HEALTH INFORMATION MANAGEMENT | Facility: OTHER | Age: 38
End: 2023-10-25
Payer: COMMERCIAL

## 2024-02-05 ENCOUNTER — TELEPHONE (OUTPATIENT)
Dept: HEALTH INFORMATION MANAGEMENT | Facility: OTHER | Age: 39
End: 2024-02-05
Payer: COMMERCIAL